# Patient Record
Sex: MALE | Race: OTHER | HISPANIC OR LATINO | Employment: OTHER | URBAN - METROPOLITAN AREA
[De-identification: names, ages, dates, MRNs, and addresses within clinical notes are randomized per-mention and may not be internally consistent; named-entity substitution may affect disease eponyms.]

---

## 2017-05-07 ENCOUNTER — HOSPITAL ENCOUNTER (EMERGENCY)
Facility: HOSPITAL | Age: 42
Discharge: HOME | End: 2017-05-08
Attending: EMERGENCY MEDICINE
Payer: COMMERCIAL

## 2017-05-07 DIAGNOSIS — R06.02 SHORTNESS OF BREATH: Primary | ICD-10-CM

## 2017-05-07 DIAGNOSIS — R04.2 BLOOD-TINGED SPUTUM: ICD-10-CM

## 2017-05-07 DIAGNOSIS — I10 ESSENTIAL HYPERTENSION: ICD-10-CM

## 2017-05-07 DIAGNOSIS — F14.90 COCAINE USE: ICD-10-CM

## 2017-05-07 DIAGNOSIS — J40 BRONCHITIS: ICD-10-CM

## 2017-05-07 LAB
ALBUMIN SERPL BCP-MCNC: 3.9 G/DL
ALP SERPL-CCNC: 66 U/L
ALT SERPL W/O P-5'-P-CCNC: 44 U/L
AMPHET+METHAMPHET UR QL: NEGATIVE
ANION GAP SERPL CALC-SCNC: 11 MMOL/L
APAP SERPL-MCNC: <3 UG/ML
APTT BLDCRRT: 25.6 SEC
AST SERPL-CCNC: 44 U/L
BACTERIA #/AREA URNS HPF: ABNORMAL /HPF
BARBITURATES UR QL SCN>200 NG/ML: NEGATIVE
BASOPHILS # BLD AUTO: 0.02 K/UL
BASOPHILS NFR BLD: 0.1 %
BENZODIAZ UR QL SCN>200 NG/ML: NEGATIVE
BILIRUB SERPL-MCNC: 0.7 MG/DL
BILIRUB UR QL STRIP: NEGATIVE
BNP SERPL-MCNC: 103 PG/ML
BUN SERPL-MCNC: 10 MG/DL
BZE UR QL SCN: NORMAL
CALCIUM SERPL-MCNC: 9.1 MG/DL
CANNABINOIDS UR QL SCN: NEGATIVE
CHLORIDE SERPL-SCNC: 101 MMOL/L
CLARITY UR: CLEAR
CO2 SERPL-SCNC: 26 MMOL/L
COLOR UR: YELLOW
CREAT SERPL-MCNC: 1.1 MG/DL
CREAT UR-MCNC: 312.4 MG/DL
DIFFERENTIAL METHOD: ABNORMAL
EOSINOPHIL # BLD AUTO: 0 K/UL
EOSINOPHIL NFR BLD: 0.2 %
ERYTHROCYTE [DISTWIDTH] IN BLOOD BY AUTOMATED COUNT: 13.8 %
EST. GFR  (AFRICAN AMERICAN): >60 ML/MIN/1.73 M^2
EST. GFR  (NON AFRICAN AMERICAN): >60 ML/MIN/1.73 M^2
ETHANOL SERPL-MCNC: <10 MG/DL
GLUCOSE SERPL-MCNC: 115 MG/DL
GLUCOSE UR QL STRIP: ABNORMAL
HCT VFR BLD AUTO: 52.7 %
HGB BLD-MCNC: 18 G/DL
HGB UR QL STRIP: ABNORMAL
HYALINE CASTS #/AREA URNS LPF: 0 /LPF
INR PPP: 1
KETONES UR QL STRIP: ABNORMAL
LEUKOCYTE ESTERASE UR QL STRIP: ABNORMAL
LYMPHOCYTES # BLD AUTO: 1.5 K/UL
LYMPHOCYTES NFR BLD: 10.7 %
MAGNESIUM SERPL-MCNC: 1.9 MG/DL
MCH RBC QN AUTO: 29.4 PG
MCHC RBC AUTO-ENTMCNC: 34.2 %
MCV RBC AUTO: 86 FL
METHADONE UR QL SCN>300 NG/ML: NEGATIVE
MICROSCOPIC COMMENT: ABNORMAL
MONOCYTES # BLD AUTO: 1 K/UL
MONOCYTES NFR BLD: 6.8 %
NEUTROPHILS # BLD AUTO: 11.8 K/UL
NEUTROPHILS NFR BLD: 82 %
NITRITE UR QL STRIP: NEGATIVE
OPIATES UR QL SCN: NEGATIVE
PCP UR QL SCN>25 NG/ML: NEGATIVE
PH UR STRIP: 7 [PH] (ref 5–8)
PLATELET # BLD AUTO: 159 K/UL
PMV BLD AUTO: 10.8 FL
POTASSIUM SERPL-SCNC: 3.6 MMOL/L
PROT SERPL-MCNC: 7.7 G/DL
PROT UR QL STRIP: ABNORMAL
PROTHROMBIN TIME: 10.7 SEC
RBC # BLD AUTO: 6.13 M/UL
RBC #/AREA URNS HPF: 5 /HPF (ref 0–4)
SALICYLATES SERPL-MCNC: <5 MG/DL
SODIUM SERPL-SCNC: 138 MMOL/L
SP GR UR STRIP: 1.02 (ref 1–1.03)
TOXICOLOGY INFORMATION: NORMAL
TROPONIN I SERPL DL<=0.01 NG/ML-MCNC: 0.02 NG/ML
TSH SERPL DL<=0.005 MIU/L-ACNC: 0.45 UIU/ML
URN SPEC COLLECT METH UR: ABNORMAL
UROBILINOGEN UR STRIP-ACNC: NEGATIVE EU/DL
WBC # BLD AUTO: 14.37 K/UL
WBC #/AREA URNS HPF: 0 /HPF (ref 0–5)

## 2017-05-07 PROCEDURE — 25000003 PHARM REV CODE 250: Performed by: EMERGENCY MEDICINE

## 2017-05-07 PROCEDURE — 80053 COMPREHEN METABOLIC PANEL: CPT

## 2017-05-07 PROCEDURE — 81000 URINALYSIS NONAUTO W/SCOPE: CPT

## 2017-05-07 PROCEDURE — 84443 ASSAY THYROID STIM HORMONE: CPT

## 2017-05-07 PROCEDURE — 84484 ASSAY OF TROPONIN QUANT: CPT

## 2017-05-07 PROCEDURE — 83735 ASSAY OF MAGNESIUM: CPT

## 2017-05-07 PROCEDURE — 82570 ASSAY OF URINE CREATININE: CPT

## 2017-05-07 PROCEDURE — 25500020 PHARM REV CODE 255: Performed by: EMERGENCY MEDICINE

## 2017-05-07 PROCEDURE — 80307 DRUG TEST PRSMV CHEM ANLYZR: CPT

## 2017-05-07 PROCEDURE — 85025 COMPLETE CBC W/AUTO DIFF WBC: CPT

## 2017-05-07 PROCEDURE — 85610 PROTHROMBIN TIME: CPT

## 2017-05-07 PROCEDURE — 85730 THROMBOPLASTIN TIME PARTIAL: CPT

## 2017-05-07 PROCEDURE — 83880 ASSAY OF NATRIURETIC PEPTIDE: CPT

## 2017-05-07 PROCEDURE — 93005 ELECTROCARDIOGRAM TRACING: CPT

## 2017-05-07 PROCEDURE — 96361 HYDRATE IV INFUSION ADD-ON: CPT

## 2017-05-07 PROCEDURE — 63600175 PHARM REV CODE 636 W HCPCS: Performed by: EMERGENCY MEDICINE

## 2017-05-07 PROCEDURE — 80320 DRUG SCREEN QUANTALCOHOLS: CPT

## 2017-05-07 PROCEDURE — 80307 DRUG TEST PRSMV CHEM ANLYZR: CPT | Mod: 59

## 2017-05-07 PROCEDURE — 80329 ANALGESICS NON-OPIOID 1 OR 2: CPT

## 2017-05-07 PROCEDURE — 96374 THER/PROPH/DIAG INJ IV PUSH: CPT

## 2017-05-07 PROCEDURE — 99284 EMERGENCY DEPT VISIT MOD MDM: CPT | Mod: 25

## 2017-05-07 RX ORDER — DIAZEPAM 10 MG/2ML
5 INJECTION INTRAMUSCULAR
Status: COMPLETED | OUTPATIENT
Start: 2017-05-07 | End: 2017-05-07

## 2017-05-07 RX ADMIN — DIAZEPAM 5 MG: 5 INJECTION, SOLUTION INTRAMUSCULAR; INTRAVENOUS at 09:05

## 2017-05-07 RX ADMIN — IOHEXOL 70 ML: 350 INJECTION, SOLUTION INTRAVENOUS at 08:05

## 2017-05-07 RX ADMIN — SODIUM CHLORIDE 1000 ML: 0.9 INJECTION, SOLUTION INTRAVENOUS at 09:05

## 2017-05-07 RX ADMIN — SODIUM CHLORIDE 1000 ML: 0.9 INJECTION, SOLUTION INTRAVENOUS at 07:05

## 2017-05-07 NOTE — ED AVS SNAPSHOT
[facility]  [address] [address]               Bernabe Hale   2017  7:30 PM   ED    Description:  Male : 1975   Department:  [facility]           Your Care was Coordinated By:     Provider Role From To    Nela Page MD Attending Provider [phone] --      Reason for Visit     Shortness of Breath           Diagnoses this Visit        Comments    Shortness of breath    -  Primary     Bronchitis         Blood-tinged sputum         Essential hypertension         Cocaine use           ED Disposition     ED Disposition Condition Comment    Discharge             To Do List           Follow-up Information     Please follow up.    Why:  Follow-up with your primary care doctor this week.       These Medications        Disp Refills Start End    hydrochlorothiazide (HYDRODIURIL) 25 MG tablet 30 tablet 0 2017    Take 1 tablet (25 mg total) by mouth once daily. - Oral    doxycycline (VIBRAMYCIN) 100 MG Cap 14 capsule 0 2017 5/15/2017    Take 1 capsule (100 mg total) by mouth 2 (two) times daily. - Oral      [facility]     [facility] Care Line -  Assistance  Unless otherwise directed by your provider, please contact [facility], our nurse care line that is available for  assistance.     Registered nurses in the [facility] provide: appointment scheduling, clinical advisement, health education, and other advisory services.  Call: [phone] (toll free)               Medications           Message regarding Medications     Verify the changes and/or additions to your medication regime listed below are the same as discussed with your clinician today.  If any of these changes or additions are incorrect, please notify your healthcare provider.        START taking these NEW medications        Refills    hydrochlorothiazide (HYDRODIURIL) 25 MG tablet 0    Sig: Take 1  "tablet (25 mg total) by mouth once daily.    Class: Print    Route: Oral    doxycycline (VIBRAMYCIN) 100 MG Cap 0    Sig: Take 1 capsule (100 mg total) by mouth 2 (two) times daily.    Class: Print    Route: Oral      These medications were administered today        Dose Freq    sodium chloride 0.9% bolus 1,000 mL 1,000 mL Once    Sig: Inject 1,000 mLs into the vein once.    Class: Normal    Route: Intravenous    omnipaque 350 iohexol 70 mL 70 mL IMG once as needed    Sig: Inject 70 mLs into the vein ONCE PRN for contrast.    Class: Normal    Route: Intravenous    sodium chloride 0.9% bolus 1,000 mL 1,000 mL ED 1 Time    Sig: Inject 1,000 mLs into the vein ED 1 Time.    Class: Normal    Route: Intravenous    diazePAM injection 5 mg 5 mg ED 1 Time    Sig: Inject 1 mL (5 mg total) into the vein ED 1 Time.    Class: Normal    Route: Intravenous    hydrochlorothiazide tablet 25 mg 25 mg ED 1 Time    Sig: Take 1 tablet (25 mg total) by mouth ED 1 Time.    Class: Normal    Route: Oral           Verify that the below list of medications is an accurate representation of the medications you are currently taking.  If none reported, the list may be blank. If incorrect, please contact your healthcare provider. Carry this list with you in case of emergency.           Current Medications     doxycycline (VIBRAMYCIN) 100 MG Cap Take 1 capsule (100 mg total) by mouth 2 (two) times daily.    hydrochlorothiazide (HYDRODIURIL) 25 MG tablet Take 1 tablet (25 mg total) by mouth once daily.    hydrochlorothiazide tablet 25 mg Take 1 tablet (25 mg total) by mouth ED 1 Time.    omeprazole (PRILOSEC) 20 MG capsule Take 20 mg by mouth once daily.           Clinical Reference Information           Your Vitals Were     BP Pulse Temp Resp Height Weight    187/112 96 98.8 °F (37.1 °C) (Oral) 22 5' 11" (1.803 m) 113.4 kg (250 lb)    SpO2 BMI             98% 34.87 kg/m2         Allergies as of 5/8/2017        Reactions    Avelox [Moxifloxacin]     "   Immunizations Administered on Date of Encounter - 5/8/2017     None      ED Micro, Lab, POCT     Start Ordered       Status Ordering Provider    05/07/17 1940 05/07/17 1939  TSH  Once      Final result     05/07/17 1940 05/07/17 1939  APTT  STAT      Final result     05/07/17 1940 05/07/17 1939  Protime-INR  STAT      Final result     05/07/17 1940 05/07/17 1939  Troponin I  STAT      Final result     05/07/17 1940 05/07/17 1939  B-Type natriuretic peptide (BNP)  STAT      Final result     05/07/17 1940 05/07/17 1939  Magnesium  Once      Final result     05/07/17 1939 05/07/17 1939  CBC auto differential  STAT      Final result     05/07/17 1939 05/07/17 1939  Comprehensive metabolic panel  STAT      Final result     05/07/17 1939 05/07/17 1939  Urinalysis - Clean Catch  STAT      Final result     05/07/17 1939 05/07/17 1939  Ethanol  Once      Final result     05/07/17 1939 05/07/17 1939  Acetaminophen level  Once      Final result     05/07/17 1939 05/07/17 1939  Salicylate level  Once      Final result     05/07/17 1939 05/07/17 1939  Drug screen panel, emergency  STAT      Final result     05/07/17 1939 05/07/17 1939  Urinalysis Microscopic  Once      Final result       ED Imaging Orders     Start Ordered       Status Ordering Provider    05/07/17 1940 05/07/17 1939  CTA Chest Non-Coronary (PE Study)  1 time imaging      Final result     05/07/17 1939 05/07/17 1939  X-Ray Chest AP Portable  1 time imaging      Final result         Discharge Instructions       Do not use cocaine.  Begin taking hydrochlorothiazide as prescribed.  Follow-up with primary care doctor this week for repeat blood pressure check and adjustment of blood pressure medicine.  Take antibiotics until all gone.  Return to emergency department for worsening symptoms, chest pain, persistent shortness of breath, fever, or any other concerns.    Discharge References/Attachments     HEMOPTYSIS (ENGLISH)    HYPERTENSION, NEW (BEGIN TREATMENT)  (ENGLISH)      [facility] Sign-Up     Activating your [facility] account is as easy as 1-2-3!     1) Visit [email or web address], select Sign Up Now, enter this activation code and your date of birth, then select Next.  [email or web address]  Expires: 6/22/2017 12:44 AM      2) Create a username and password to use when you visit [facility] in the future and select a security question in case you lose your password and select Next.    3) Enter your e-mail address and click Sign Up!    Additional Information  If you have questions, please e-mail [email or web address] or call [phone] to talk to our [facility] staff. Remember, [facility] is NOT to be used for urgent needs. For medical emergencies, dial 911.          [facility] complies with applicable Federal civil rights laws and does not discriminate on the basis of race, color, national origin, age, disability, or sex.        Language Assistance Services     ATTENTION: Language assistance services are available, free of charge. Please call [phone].      ATENCIÓN: Si habla español, tiene a noriega disposición servicios gratuitos de asistencia lingüística. Llame al [phone].     CHÚ Ý: N?u b?n nói Ti?ng Vi?t, có các d?ch v? h? tr? ngôn ng? mi?n phí dành cho b?n. G?i s? [phone].

## 2017-05-08 VITALS
RESPIRATION RATE: 22 BRPM | DIASTOLIC BLOOD PRESSURE: 112 MMHG | BODY MASS INDEX: 35 KG/M2 | WEIGHT: 250 LBS | TEMPERATURE: 99 F | OXYGEN SATURATION: 98 % | HEIGHT: 71 IN | HEART RATE: 96 BPM | SYSTOLIC BLOOD PRESSURE: 187 MMHG

## 2017-05-08 PROCEDURE — 25000003 PHARM REV CODE 250: Performed by: EMERGENCY MEDICINE

## 2017-05-08 RX ORDER — DOXYCYCLINE 100 MG/1
100 CAPSULE ORAL 2 TIMES DAILY
Qty: 14 CAPSULE | Refills: 0 | Status: SHIPPED | OUTPATIENT
Start: 2017-05-08 | End: 2017-05-15

## 2017-05-08 RX ORDER — HYDROCHLOROTHIAZIDE 25 MG/1
25 TABLET ORAL
Status: COMPLETED | OUTPATIENT
Start: 2017-05-08 | End: 2017-05-08

## 2017-05-08 RX ORDER — HYDROCHLOROTHIAZIDE 25 MG/1
25 TABLET ORAL DAILY
Qty: 30 TABLET | Refills: 0 | Status: SHIPPED | OUTPATIENT
Start: 2017-05-08 | End: 2023-02-17

## 2017-05-08 RX ADMIN — HYDROCHLOROTHIAZIDE 25 MG: 25 TABLET ORAL at 12:05

## 2017-05-08 NOTE — ED TRIAGE NOTES
Pt reports SOB and coughing up blood since last night. Denies and nv, weakness, chest pain. Reports some dizziness. Pt is sating 90% on RA. Pt put on 5L NC. Will continue to monitor.

## 2017-05-08 NOTE — DISCHARGE INSTRUCTIONS
Do not use cocaine.  Begin taking hydrochlorothiazide as prescribed.  Follow-up with primary care doctor this week for repeat blood pressure check and adjustment of blood pressure medicine.  Take antibiotics until all gone.  Return to emergency department for worsening symptoms, chest pain, persistent shortness of breath, fever, or any other concerns.

## 2017-05-08 NOTE — ED PROVIDER NOTES
Encounter Date: 5/7/2017    SCRIBE #1 NOTE: I, Theresa Barlow, am scribing for, and in the presence of,  Nela Page MD. I have scribed the following portions of the note - Other sections scribed: HPI/ROS.       History     Chief Complaint   Patient presents with    Shortness of Breath     since last night, reports blood tinged sputum when coughing      Review of patient's allergies indicates:   Allergen Reactions    Avelox [moxifloxacin]      HPI Comments: CC: Shortness of Breath    HPI: 41 y.o. M with no pertinent PMHx presents to the ED c/o SOB with associated productive cough beginning last night. Pt reports he is coughing up blood tinged sputum today. Symptoms are moderate. No prior tx reported. Pt denies prior similar episodes, hx of blood clots, cigarette use, fever, N/V, CP, and leg swelling.     The history is provided by the patient.     Past Medical History:   Diagnosis Date    GERD (gastroesophageal reflux disease)      No past surgical history on file.  Family History   Problem Relation Age of Onset    Family history unknown: Yes     Social History   Substance Use Topics    Smoking status: Never Smoker    Smokeless tobacco: None    Alcohol use Yes     Review of Systems   Constitutional: Negative for fever.   HENT: Negative for ear pain and sore throat.    Eyes: Negative for pain.   Respiratory: Positive for cough and shortness of breath.    Cardiovascular: Negative for chest pain.   Gastrointestinal: Negative for abdominal pain, nausea and vomiting.   Genitourinary: Negative for dysuria.   Musculoskeletal: Negative for back pain.   Skin: Negative for rash.   Neurological: Negative for headaches.       Physical Exam   Initial Vitals   BP Pulse Resp Temp SpO2   05/07/17 1926 05/07/17 1926 05/07/17 1926 05/07/17 1926 05/07/17 1926   202/121 125 22 98.8 °F (37.1 °C) 94 %     Physical Exam    Nursing note and vitals reviewed.  Constitutional: He appears well-developed and well-nourished. He is  active.  Non-toxic appearance.   Appears uncomfortable, anxious.   HENT:   Head: Normocephalic and atraumatic.   Eyes: EOM are normal.   Neck: Neck supple.   Cardiovascular: Regular rhythm and normal heart sounds. Tachycardia present.  Exam reveals no gallop and no friction rub.    No murmur heard.  Pulmonary/Chest: Breath sounds normal. No respiratory distress. He has no decreased breath sounds. He has no wheezes. He has no rhonchi. He has no rales.   Abdominal: Soft. He exhibits no distension and no pulsatile midline mass.   Musculoskeletal: Normal range of motion. He exhibits no edema.   No swelling to bilateral lower extremities.    Neurological: He is alert and oriented to person, place, and time.   Skin: Skin is warm. No rash noted.   Psychiatric: He has a normal mood and affect.         ED Course   Procedures  Labs Reviewed   CBC W/ AUTO DIFFERENTIAL - Abnormal; Notable for the following:        Result Value    WBC 14.37 (*)     Gran # 11.8 (*)     Gran% 82.0 (*)     Lymph% 10.7 (*)     All other components within normal limits   COMPREHENSIVE METABOLIC PANEL - Abnormal; Notable for the following:     Glucose 115 (*)     AST 44 (*)     All other components within normal limits   URINALYSIS - Abnormal; Notable for the following:     Protein, UA 1+ (*)     Glucose, UA 1+ (*)     Ketones, UA Trace (*)     Occult Blood UA 1+ (*)     Leukocytes, UA Trace (*)     All other components within normal limits   ACETAMINOPHEN LEVEL - Abnormal; Notable for the following:     Acetaminophen (Tylenol), Serum <3.0 (*)     All other components within normal limits   SALICYLATE LEVEL - Abnormal; Notable for the following:     Salicylate Lvl <5.0 (*)     All other components within normal limits   B-TYPE NATRIURETIC PEPTIDE - Abnormal; Notable for the following:      (*)     All other components within normal limits   URINALYSIS MICROSCOPIC - Abnormal; Notable for the following:     RBC, UA 5 (*)     All other components  "within normal limits   ALCOHOL,MEDICAL (ETHANOL)   DRUG SCREEN PANEL, URINE EMERGENCY   TSH   APTT   PROTIME-INR   TROPONIN I   MAGNESIUM     EKG Readings: (Independently Interpreted)   Sinus tachycardia, rate approximately 114.  No ST elevation. T-wave inversion present in inferior and lateral leads.  QTc 487.  No old EKG to compare.          Medical Decision Making:   History:   Old Medical Records: I decided to obtain old medical records.  Differential Diagnosis:   PE, CHF, MI, pneumonia, sepsis, among others.  Independently Interpreted Test(s):   I have ordered and independently interpreted X-rays - see summary below.       <> Summary of X-Ray Reading(s): Chest x-ray independently interpreted by me as negative for acute infiltrates, pneumothorax, effusion, widening mediastinum, or other acute cardiopulmonary process.  I have ordered and independently interpreted EKG Reading(s) - see summary below       <> Summary of EKG Reading(s): EKG independently interpreted by me, is negative for acute ischemia.  41 y.o. male with GERD, presents emergency department complaining of shortness of breath since last night, hemoptysis today.  He states he was coughing up blood-tinged sputum.  Upon arrival to emergency department, the patient is severely hypertensive, tachycardic.  Given IV fluids.  CTA chest ordered.  This is negative for acute PE.  Labs reveal mild leukocytosis, white blood cell count 14.37.  Hemoglobin 18, hematocrit 52.7.  Creatinine 1.1.  BUN 10.  Glucose 1:15.  Alcohol, Tylenol, salicylate levels are not elevated.  Troponin 0.019, .  Urine drug screen is positive for cocaine.  After further discussion with the patient, he reports being at a party last night, and admits to snorting "a little bit" of cocaine.  Patient observed in the emergency department for several hours.  His blood pressure transiently improved, however remained elevated.  Oral hydrochlorothiazide ordered.  Tachycardia improved with IV " "hydration and IV valium.  Symptoms may be directly related to cocaine, however patient states that this is unlikely since he only snorted "a little bit".  I will hold off on treating his blood pressure with beta blocker.  Patient has significantly improved.  I believe he may be discharged home with outpatient follow-up.  I will start him on hydrochlorothiazide for his elevated blood pressure and also cover for bronchitis with doxycycline.  Patient given should return warnings.  He states understanding discharge plan and is comfortable going home.            Scribe Attestation:   Scribe #1: I performed the above scribed service and the documentation accurately describes the services I performed. I attest to the accuracy of the note.    Attending Attestation:           Physician Attestation for Scribe:  Physician Attestation Statement for Scribe #1: I, Nela Page MD, reviewed documentation, as scribed by Theresa Barlow in my presence, and it is both accurate and complete.                 ED Course     Clinical Impression:   The primary encounter diagnosis was Shortness of breath. Diagnoses of Bronchitis, Blood-tinged sputum, Essential hypertension, and Cocaine use were also pertinent to this visit.          Nela Page MD  05/10/17 1403    "

## 2018-01-29 ENCOUNTER — HOSPITAL ENCOUNTER (EMERGENCY)
Facility: HOSPITAL | Age: 43
Discharge: HOME | End: 2018-01-29
Attending: EMERGENCY MEDICINE
Payer: COMMERCIAL

## 2018-01-29 VITALS
RESPIRATION RATE: 20 BRPM | HEART RATE: 96 BPM | TEMPERATURE: 99 F | DIASTOLIC BLOOD PRESSURE: 81 MMHG | BODY MASS INDEX: 33.6 KG/M2 | WEIGHT: 240 LBS | HEIGHT: 71 IN | OXYGEN SATURATION: 100 % | SYSTOLIC BLOOD PRESSURE: 124 MMHG

## 2018-01-29 DIAGNOSIS — R00.0 TACHYCARDIA: ICD-10-CM

## 2018-01-29 DIAGNOSIS — R68.89 FLU-LIKE SYMPTOMS: Primary | ICD-10-CM

## 2018-01-29 LAB
ALBUMIN SERPL BCP-MCNC: 4.3 G/DL
ALP SERPL-CCNC: 63 U/L
ALT SERPL W/O P-5'-P-CCNC: 43 U/L
ANION GAP SERPL CALC-SCNC: 10 MMOL/L
AST SERPL-CCNC: 32 U/L
BASOPHILS # BLD AUTO: 0.01 K/UL
BASOPHILS NFR BLD: 0.2 %
BILIRUB SERPL-MCNC: 0.6 MG/DL
BUN SERPL-MCNC: 14 MG/DL
CALCIUM SERPL-MCNC: 9.9 MG/DL
CHLORIDE SERPL-SCNC: 102 MMOL/L
CO2 SERPL-SCNC: 23 MMOL/L
CREAT SERPL-MCNC: 1.6 MG/DL
DIFFERENTIAL METHOD: ABNORMAL
EOSINOPHIL # BLD AUTO: 0 K/UL
EOSINOPHIL NFR BLD: 0 %
ERYTHROCYTE [DISTWIDTH] IN BLOOD BY AUTOMATED COUNT: 13.5 %
EST. GFR  (AFRICAN AMERICAN): >60 ML/MIN/1.73 M^2
EST. GFR  (NON AFRICAN AMERICAN): 52 ML/MIN/1.73 M^2
GLUCOSE SERPL-MCNC: 97 MG/DL
HCT VFR BLD AUTO: 48.8 %
HGB BLD-MCNC: 17.3 G/DL
INR PPP: 1.1
LYMPHOCYTES # BLD AUTO: 0.9 K/UL
LYMPHOCYTES NFR BLD: 14.5 %
MCH RBC QN AUTO: 29.6 PG
MCHC RBC AUTO-ENTMCNC: 35.5 G/DL
MCV RBC AUTO: 84 FL
MONOCYTES # BLD AUTO: 1 K/UL
MONOCYTES NFR BLD: 15.8 %
NEUTROPHILS # BLD AUTO: 4.4 K/UL
NEUTROPHILS NFR BLD: 69.3 %
PLATELET # BLD AUTO: 113 K/UL
PMV BLD AUTO: 10.7 FL
POTASSIUM SERPL-SCNC: 3.4 MMOL/L
PROT SERPL-MCNC: 8.4 G/DL
PROTHROMBIN TIME: 11.7 SEC
RBC # BLD AUTO: 5.84 M/UL
SODIUM SERPL-SCNC: 135 MMOL/L
WBC # BLD AUTO: 6.4 K/UL

## 2018-01-29 PROCEDURE — 80053 COMPREHEN METABOLIC PANEL: CPT

## 2018-01-29 PROCEDURE — 93005 ELECTROCARDIOGRAM TRACING: CPT

## 2018-01-29 PROCEDURE — 93010 ELECTROCARDIOGRAM REPORT: CPT | Mod: ,,, | Performed by: INTERNAL MEDICINE

## 2018-01-29 PROCEDURE — 25000003 PHARM REV CODE 250: Performed by: NURSE PRACTITIONER

## 2018-01-29 PROCEDURE — 85610 PROTHROMBIN TIME: CPT

## 2018-01-29 PROCEDURE — 85025 COMPLETE CBC W/AUTO DIFF WBC: CPT

## 2018-01-29 PROCEDURE — 96360 HYDRATION IV INFUSION INIT: CPT

## 2018-01-29 PROCEDURE — 99284 EMERGENCY DEPT VISIT MOD MDM: CPT | Mod: 25

## 2018-01-29 PROCEDURE — 87040 BLOOD CULTURE FOR BACTERIA: CPT | Mod: 59

## 2018-01-29 RX ORDER — OSELTAMIVIR PHOSPHATE 75 MG/1
75 CAPSULE ORAL 2 TIMES DAILY
Qty: 10 CAPSULE | Refills: 0 | Status: SHIPPED | OUTPATIENT
Start: 2018-01-29 | End: 2018-02-03

## 2018-01-29 RX ORDER — ONDANSETRON 2 MG/ML
8 INJECTION INTRAMUSCULAR; INTRAVENOUS
Status: DISCONTINUED | OUTPATIENT
Start: 2018-01-29 | End: 2018-01-29 | Stop reason: HOSPADM

## 2018-01-29 RX ORDER — PROMETHAZINE HYDROCHLORIDE AND DEXTROMETHORPHAN HYDROBROMIDE 6.25; 15 MG/5ML; MG/5ML
5 SYRUP ORAL EVERY 6 HOURS PRN
Qty: 118 ML | Refills: 0 | Status: SHIPPED | OUTPATIENT
Start: 2018-01-29 | End: 2018-02-08

## 2018-01-29 RX ORDER — OSELTAMIVIR PHOSPHATE 75 MG/1
75 CAPSULE ORAL
Status: COMPLETED | OUTPATIENT
Start: 2018-01-29 | End: 2018-01-29

## 2018-01-29 RX ORDER — PROMETHAZINE HYDROCHLORIDE AND CODEINE PHOSPHATE 6.25; 1 MG/5ML; MG/5ML
5 SOLUTION ORAL
Status: COMPLETED | OUTPATIENT
Start: 2018-01-29 | End: 2018-01-29

## 2018-01-29 RX ORDER — ONDANSETRON 4 MG/1
4 TABLET, FILM COATED ORAL EVERY 6 HOURS PRN
Qty: 12 TABLET | Refills: 0 | Status: SHIPPED | OUTPATIENT
Start: 2018-01-29 | End: 2019-11-20

## 2018-01-29 RX ORDER — ACETAMINOPHEN 500 MG
1000 TABLET ORAL
Status: COMPLETED | OUTPATIENT
Start: 2018-01-29 | End: 2018-01-29

## 2018-01-29 RX ADMIN — ACETAMINOPHEN 1000 MG: 500 TABLET ORAL at 02:01

## 2018-01-29 RX ADMIN — SODIUM CHLORIDE 2000 ML: 0.9 INJECTION, SOLUTION INTRAVENOUS at 01:01

## 2018-01-29 RX ADMIN — OSELTAMIVIR PHOSPHATE 75 MG: 75 CAPSULE ORAL at 02:01

## 2018-01-29 RX ADMIN — PROMETHAZINE HYDROCHLORIDE AND CODEINE PHOSPHATE 5 ML: 10; 6.25 SOLUTION ORAL at 03:01

## 2018-01-29 NOTE — DISCHARGE INSTRUCTIONS
Alternate ibuprofen and tylenol for fever and pain. Drink plenty of fluids. Rest.     Please return to the Emergency Department for any new or worsening symptoms including: fever, chest pain, shortness of breath, loss of consciousness, dizziness, weakness, or any other concerns.     Please follow up with your Primary Care Provider within in the week. If you do not have one, you may contact the one listed on your discharge paperwork or you may also call the [facility]k at [phone] to schedule an appointment with one.     Please take all medication as prescribed.

## 2018-01-29 NOTE — ED TRIAGE NOTES
"Pt arrived to ER with complaints of " I have been having fever and cough (flu-like symptoms) since Saturday". Pt rates the pain as 4/10 at this time. Pt reports taking Theraflu and Tylenol since Saturday. Pt reports nausea, denies vomiting, denies shortness of breath or urinary symptoms.   Past Medical History:   Diagnosis Date    GERD (gastroesophageal reflux disease)      No past surgical history on file.     "

## 2018-01-29 NOTE — ED PROVIDER NOTES
Encounter Date: 1/29/2018       History     Chief Complaint   Patient presents with    Fever     Reports having fever and chills x 2 days. Reports taking theraflu and tylenol severe that broke his fever.    Chills     CC: Fever, chills    HPI: Patient is a 42-year-old male who presents today with fever and chills since Saturday. He also reports associated congestion, rhinorrhea, bilateral ear pain, nonproductive cough, nausea, and diarrhea.  Reports 2 soft bowel movements today.  No episodes of emesis.  He took TheraFlu and Tylenol at 3 AM. He denies sore throat, shortness of breath, chest pain, [place] pain, urinary changes.      The history is provided by the patient. No  was used.     Review of patient's allergies indicates:   Allergen Reactions    Avelox [moxifloxacin] Anaphylaxis     Past Medical History:   Diagnosis Date    GERD (gastroesophageal reflux disease)      History reviewed. No pertinent surgical history.  Family History   Problem Relation Age of Onset    Family history unknown: Yes     Social History   Substance Use Topics    Smoking status: Never Smoker    Smokeless tobacco: Not on file    Alcohol use Yes      Comment: occasional     Review of Systems   Constitutional: Positive for chills and fever. Negative for activity change and appetite change.   HENT: Positive for congestion and rhinorrhea. Negative for ear discharge, ear pain, sinus pain, sinus pressure and sore throat.    Eyes: Negative for photophobia, pain, discharge and itching.   Respiratory: Positive for cough. Negative for apnea, choking, chest tightness, shortness of breath, wheezing and stridor.    Cardiovascular: Negative for chest pain, palpitations and leg swelling.   Gastrointestinal: Positive for diarrhea and nausea. Negative for abdominal pain and constipation.   Genitourinary: Negative for decreased urine volume, difficulty urinating, dysuria, frequency and urgency.   Musculoskeletal: Negative for  back pain, neck pain and neck stiffness.   Skin: Negative for rash.   Neurological: Positive for headaches. Negative for dizziness, weakness and light-headedness.   Hematological: Does not bruise/bleed easily.       Physical Exam     Initial Vitals [01/29/18 1012]   BP Pulse Resp Temp SpO2   (!) 127/98 (!) 125 20 99.5 °F (37.5 °C) 97 %      MAP       107.67         Physical Exam    Vitals reviewed.  Constitutional: He appears well-developed and well-nourished. He is not diaphoretic. He does not appear ill. No distress. He is not intubated.   HENT:   Head: Normocephalic and atraumatic.   Right Ear: Hearing, tympanic membrane, external ear and ear canal normal. Tympanic membrane is not erythematous and not bulging. No middle ear effusion.   Left Ear: Hearing, tympanic membrane, external ear and ear canal normal. Tympanic membrane is not erythematous and not bulging.  No middle ear effusion.   Nose: Mucosal edema and rhinorrhea present. Right sinus exhibits no maxillary sinus tenderness and no frontal sinus tenderness. Left sinus exhibits no maxillary sinus tenderness and no frontal sinus tenderness.   Mouth/Throat: Uvula is midline, oropharynx is clear and moist and mucous membranes are normal. No trismus in the jaw. No uvula swelling. No oropharyngeal exudate, posterior oropharyngeal edema, posterior oropharyngeal erythema or tonsillar abscesses.   Eyes: Conjunctivae and EOM are normal. Pupils are equal, round, and reactive to light. Right eye exhibits no discharge. Left eye exhibits no discharge.   Neck: Normal range of motion and full passive range of motion without pain. Neck supple. No spinous process tenderness and no muscular tenderness present. No edema, no erythema and normal range of motion present. No neck rigidity.   Cardiovascular: Normal rate, regular rhythm and normal heart sounds. Exam reveals no gallop and no friction rub.    No murmur heard.  Pulmonary/Chest: Breath sounds normal. No accessory muscle  usage. No apnea, no tachypnea and no bradypnea. He is not intubated. No respiratory distress. He has no wheezes. He has no rhonchi. He has no rales. He exhibits no tenderness.   Abdominal: Soft. Bowel sounds are normal. He exhibits no distension and no mass. There is no hepatosplenomegaly. There is no tenderness. There is no rigidity, no rebound, no guarding, no CVA tenderness, no tenderness at McBurney's point and negative Araujo's sign.   Musculoskeletal: Normal range of motion.   Neurological: He is alert and oriented to person, place, and time. GCS eye subscore is 4. GCS verbal subscore is 5. GCS motor subscore is 6.   Skin: Skin is warm, dry and intact. No rash noted. No erythema.   Psychiatric: He has a normal mood and affect. Thought content normal.         ED Course   Procedures  Labs Reviewed   CBC W/ AUTO DIFFERENTIAL - Abnormal; Notable for the following:        Result Value    Platelets 113 (*)     Lymph # 0.9 (*)     Lymph% 14.5 (*)     Mono% 15.8 (*)     All other components within normal limits    Narrative:     Recoll. [id number] by TW at 01/29/2018 12:52, reason: Specimen   clotted  Tube has been discarded   COMPREHENSIVE METABOLIC PANEL - Abnormal; Notable for the following:     Sodium 135 (*)     Potassium 3.4 (*)     Creatinine 1.6 (*)     eGFR if non  52 (*)     All other components within normal limits   CULTURE, BLOOD   CULTURE, BLOOD   PROTIME-INR    Narrative:     Recoll. [id number] by TWW at 01/29/2018 12:52, reason: Specimen   clotted  Tube has been discarded     EKG Readings: (Independently Interpreted)   Initial Reading: No STEMI. Rhythm: Sinus Tachycardia. Heart Rate: 125. Ectopy: No Ectopy. Conduction: Normal.   Reviewed by Dr. Cardona.          Medical Decision Making:   ED Management:  This is an evaluation of a 42 y.o. male that presents to the Emergency Department for fever, chills, cough, rhinorrhea and nasal congestion for 3 days. The patient is a non-toxic,  afebrile, and well appearing male. On physical exam ears and pharynx are without evidence of infection. Appears well hydrated with moist mucus membranes. Neck soft and supple with no meningeal signs or cervical lymphadenopathy. Breath sounds are clear and equal bilaterally with no adventitious breath sounds, tachypnea or respiratory distress with room air pulse ox of 100% and no evidence of hypoxia.     Vital Signs Are Reassuring.  RESULTS:   CBC was negativefor leukocytosis  indicating unlikely systemic infection, the H&H was stable and was within normal limits and indicating absence of anemia. The platelet count was normal indicating the absence of a tendency toward bleeding.  K 3.4; Cr 1.6; eGFR 52.   PT 11.7, INR 1.1.  Blood Culture ×2 pending.     My overall impression flulike symptoms. I considered, but at this time, do not suspect OM, OE, strep pharyngitis, meningitis, pneumonia, or acute bacterial sinusitis.    He was given 2 L normal saline, Tylenol, and Tamiflu with good relief in symptoms.  Initially tachycardic with heart rate 125.  After fluids and medication heart rate improved to 96.  Afebrile. Tolerating oral intake with no episodes of nausea or vomiting.  Patient has full symptoms, I'll treat him with Tamiflu.    ED Course: IV, IV fluids, promethazine with codeine, Tylenol, Tamiflu. D/C Meds: Zofran, promethazine DM, Tamiflu. Additional D/C Information: take Motrin and Tylenol for fever. The diagnosis, treatment plan, instructions for follow-up and reevaluation with PCP as well as ED return precautions were discussed and understanding was verbalized. All questions or concerns have been addressed.     This case was discussed with Dr. Cardona who is in agreement with my assessment and plan.                    ED Course      Clinical Impression:   The primary encounter diagnosis was Flu-like symptoms. A diagnosis of Tachycardia was also pertinent to this visit.    Disposition:   Disposition:  Discharged  Condition: Stable                        Annalisa Bergman, MARLENY  01/29/18 1936

## 2018-02-03 LAB
BACTERIA BLD CULT: NORMAL
BACTERIA BLD CULT: NORMAL

## 2019-11-20 ENCOUNTER — HOSPITAL ENCOUNTER (EMERGENCY)
Facility: HOSPITAL | Age: 44
Discharge: HOME | End: 2019-11-20
Attending: EMERGENCY MEDICINE
Payer: COMMERCIAL

## 2019-11-20 VITALS
DIASTOLIC BLOOD PRESSURE: 98 MMHG | HEART RATE: 81 BPM | HEIGHT: 71 IN | RESPIRATION RATE: 18 BRPM | SYSTOLIC BLOOD PRESSURE: 139 MMHG | WEIGHT: 245 LBS | BODY MASS INDEX: 34.3 KG/M2 | TEMPERATURE: 98 F | OXYGEN SATURATION: 98 %

## 2019-11-20 DIAGNOSIS — J95.830 HEMORRHAGE FOLLOWING TONSILLECTOMY: Primary | ICD-10-CM

## 2019-11-20 LAB
ABO + RH BLD: NORMAL
ANION GAP SERPL CALC-SCNC: 7 MMOL/L (ref 8–16)
BASOPHILS # BLD AUTO: 0.01 K/UL (ref 0–0.2)
BASOPHILS NFR BLD: 0.1 % (ref 0–1.9)
BLD GP AB SCN CELLS X3 SERPL QL: NORMAL
BUN SERPL-MCNC: 33 MG/DL (ref 6–20)
CALCIUM SERPL-MCNC: 8.9 MG/DL (ref 8.7–10.5)
CHLORIDE SERPL-SCNC: 107 MMOL/L (ref 95–110)
CO2 SERPL-SCNC: 24 MMOL/L (ref 23–29)
CREAT SERPL-MCNC: 1 MG/DL (ref 0.5–1.4)
DIFFERENTIAL METHOD: NORMAL
EOSINOPHIL # BLD AUTO: 0.1 K/UL (ref 0–0.5)
EOSINOPHIL NFR BLD: 1.1 % (ref 0–8)
ERYTHROCYTE [DISTWIDTH] IN BLOOD BY AUTOMATED COUNT: 12.9 % (ref 11.5–14.5)
EST. GFR  (AFRICAN AMERICAN): >60 ML/MIN/1.73 M^2
EST. GFR  (NON AFRICAN AMERICAN): >60 ML/MIN/1.73 M^2
GLUCOSE SERPL-MCNC: 96 MG/DL (ref 70–110)
HCT VFR BLD AUTO: 47.8 % (ref 40–54)
HGB BLD-MCNC: 15.7 G/DL (ref 14–18)
IMM GRANULOCYTES # BLD AUTO: 0.02 K/UL (ref 0–0.04)
IMM GRANULOCYTES NFR BLD AUTO: 0.2 % (ref 0–0.5)
LYMPHOCYTES # BLD AUTO: 2 K/UL (ref 1–4.8)
LYMPHOCYTES NFR BLD: 23.7 % (ref 18–48)
MCH RBC QN AUTO: 28.2 PG (ref 27–31)
MCHC RBC AUTO-ENTMCNC: 32.8 G/DL (ref 32–36)
MCV RBC AUTO: 86 FL (ref 82–98)
MONOCYTES # BLD AUTO: 0.7 K/UL (ref 0.3–1)
MONOCYTES NFR BLD: 7.9 % (ref 4–15)
NEUTROPHILS # BLD AUTO: 5.6 K/UL (ref 1.8–7.7)
NEUTROPHILS NFR BLD: 67 % (ref 38–73)
NRBC BLD-RTO: 0 /100 WBC
PLATELET # BLD AUTO: 164 K/UL (ref 150–350)
PMV BLD AUTO: 10.2 FL (ref 9.2–12.9)
POTASSIUM SERPL-SCNC: 4 MMOL/L (ref 3.5–5.1)
RBC # BLD AUTO: 5.56 M/UL (ref 4.6–6.2)
SODIUM SERPL-SCNC: 138 MMOL/L (ref 136–145)
WBC # BLD AUTO: 8.4 K/UL (ref 3.9–12.7)

## 2019-11-20 PROCEDURE — 96360 HYDRATION IV INFUSION INIT: CPT

## 2019-11-20 PROCEDURE — 85025 COMPLETE CBC W/AUTO DIFF WBC: CPT

## 2019-11-20 PROCEDURE — 80048 BASIC METABOLIC PNL TOTAL CA: CPT

## 2019-11-20 PROCEDURE — 63600175 PHARM REV CODE 636 W HCPCS: Performed by: EMERGENCY MEDICINE

## 2019-11-20 PROCEDURE — 99284 EMERGENCY DEPT VISIT MOD MDM: CPT | Mod: 25

## 2019-11-20 PROCEDURE — 86901 BLOOD TYPING SEROLOGIC RH(D): CPT

## 2019-11-20 RX ORDER — IRBESARTAN 300 MG/1
300 TABLET ORAL NIGHTLY
COMMUNITY
End: 2023-02-17

## 2019-11-20 RX ADMIN — SODIUM CHLORIDE 1000 ML: 0.9 INJECTION, SOLUTION INTRAVENOUS at 08:11

## 2019-11-21 NOTE — ED PROVIDER NOTES
Encounter Date: 11/20/2019    SCRIBE #1 NOTE: I, Palomo Barragan, am scribing for, and in the presence of, Jose Roberto Smith MD. Other sections scribed: HPI, ROS, PE.       History     Chief Complaint   Patient presents with    Post Tonsilectomy Bleeding     has tonsils,adnoids, uvual, soft palat removed on Monday. Patient reports he's been bleeding since 1600pm this evening and it has not stopped.      This is a 43 y.o. Male with a PMHx of GERD who presents to the ED with complaints of bleeding from the throat that began after the pt had his tonsils, adnoids, uvula, and soft palate removed 9 days ago to treat sleep apnea. Patient reports he's been bleeding since 16:00 pm this evening and it has not stopped. He was asleep when the bleeding began and denies coughing prior to onset of bleeding.  States he is spitting out small clots.  Patient states he feels weak.  Denies near syncope.  No bleeding prior to this afternoon.  Pt reports nausea, generalized weakness, and light-headedness. He denies any pain, emesis, hematemesis, LOC, syncope, or any worsening or alleviating factors. Surgery performed by Dr Luis Antonio Rodriguez in [place]. This pt is allergic to Avelox.  .    The history is provided by the patient and medical records.     Review of patient's allergies indicates:   Allergen Reactions    Avelox [moxifloxacin] Anaphylaxis     Past Medical History:   Diagnosis Date    GERD (gastroesophageal reflux disease)     Hypertension      Past Surgical History:   Procedure Laterality Date    TONSILLECTOMY  11/11/2019     Family History   Family history unknown: Yes     Social History     Tobacco Use    Smoking status: Never Smoker    Smokeless tobacco: Never Used   Substance Use Topics    Alcohol use: Yes     Comment: occasional    Drug use: No     Review of Systems   Constitutional: Negative for chills, diaphoresis and fever.   HENT: Positive for postnasal drip and sore throat. Negative for congestion, nosebleeds, sinus  pressure, sinus pain, trouble swallowing and voice change.    Eyes: Negative.    Respiratory: Negative for cough and shortness of breath.    Cardiovascular: Negative for chest pain and palpitations.   Gastrointestinal: Positive for nausea. Negative for abdominal pain, diarrhea and vomiting.        NO melena or rectal bleeding   Genitourinary: Negative for dysuria, flank pain, frequency and testicular pain.   Neurological: Positive for weakness and light-headedness. Negative for dizziness, syncope, speech difficulty, numbness and headaches.        No numbness   All other systems reviewed and are negative.      Physical Exam     Initial Vitals   BP Pulse Resp Temp SpO2   11/20/19 1914 11/20/19 1914 11/20/19 1914 11/20/19 1914 11/20/19 1952   (!) 138/93 82 15 98 °F (36.7 °C) 96 %      MAP       --                Physical Exam    Nursing note and vitals reviewed.  Constitutional: He appears well-developed and well-nourished. He is not diaphoretic. No distress.   HENT:   Head: Normocephalic and atraumatic.   Nose: Nose normal.   There is eschar in bilateral tonsillar fossas.   There is mild bleeding from the left tonsillar fossa from the superior portion of the scar.  Voice is normal. Patient is handling secretions well.   Eyes: Conjunctivae and EOM are normal. Pupils are equal, round, and reactive to light. Right eye exhibits no discharge. Left eye exhibits no discharge. No scleral icterus.   Neck: Neck supple. No tracheal deviation present.   Cardiovascular: Normal rate, regular rhythm and normal heart sounds.   No murmur heard.  Pulmonary/Chest: Breath sounds normal. No stridor. No respiratory distress. He has no wheezes. He has no rhonchi. He has no rales.   Abdominal: Soft. He exhibits no distension. There is no tenderness. There is no rebound and no guarding.   Musculoskeletal: Normal range of motion. He exhibits no edema or tenderness.   Lymphadenopathy:     He has no cervical adenopathy.   Neurological: He is  alert and oriented to person, place, and time. He has normal strength.   Skin: Skin is warm and dry. No rash noted.   Psychiatric: He has a normal mood and affect. His behavior is normal. Judgment and thought content normal.         ED Course   Procedures  Labs Reviewed   BASIC METABOLIC PANEL - Abnormal; Notable for the following components:       Result Value    BUN, Bld 33 (*)     Anion Gap 7 (*)     All other components within normal limits   CBC W/ AUTO DIFFERENTIAL   TYPE & SCREEN          Imaging Results    None          Medical Decision Making:   History:   Old Medical Records: I decided to obtain old medical records.  Initial Assessment:   Patient presents with postoperative bleeding after tonsillectomy/adenoidectomy.  Mild bleeding noted from left tonsillar fossa.  Heart rate is normal. Blood pressure is normal. Will check CBC.  Will discuss with ENT service.  Differential Diagnosis:   Postoperative bleeding, anemia  Clinical Tests:   Lab Tests: Ordered and Reviewed  The following lab test(s) were unremarkable: CBC and BMP  ED Management:  2040:  No significant change lab work.  Mild tachycardia on standing.  Will give IV fluids.  Bleeding has slowed down.  Minimal blood present in left tonsillar fossa on repeat exam.  Awaiting callback from ENT.    2050:  Discussed with ENT at [facility].  Since symptoms are improving recommended continue observation.  If symptoms resolve spontaneous patient may follow up outpatient tomorrow with Dr. Rodriguez.  If symptoms progress will transfer patient to [facility] for observation admission and possible cauterization.  No other interventions recommended at this time.  Discussed plan with patient and family.  They are agreeable.    2135:  Bleeding stopped.  Patient tolerating p.o. May follow up as an outpatient tomorrow.  Will return if symptoms worsen or new symptoms develop.            Scribe Attestation:   Scribe #1: I performed the above scribed service and the  documentation accurately describes the services I performed. I attest to the accuracy of the note.                          Clinical Impression:       ICD-10-CM ICD-9-CM   1. Hemorrhage following tonsillectomy J95.830 998.11         Disposition:   Disposition: Discharged  Condition: Stable      I, Jose Roberto Smith MD, personally performed the services described in this documentation. All medical record entries made by the scribe were at my direction and in my presence.  I have reviewed the chart and agree that the record reflects my personal performance and is accurate and complete                    Jose Roberto Smith MD  11/20/19 [id number]

## 2019-11-21 NOTE — ED TRIAGE NOTES
Pt reports to ED via personal transportation with girlfriend with c/o bright red bleeding (with clots) from his throat starting at 4pm today; pt had tonsils, adenoids, and part of the soft palate and uvula removed last Monday 11/11/19; pt also reports pain to throat since sx but has been taking Ibuprofen 800mg (last dose 3:30pm); pt has emesis bag at bedside with small amt of bright red blood noted; pt AAOx4; will continue to monitor

## 2020-10-08 DIAGNOSIS — M23.92 INTERNAL DERANGEMENT OF LEFT KNEE: Primary | ICD-10-CM

## 2021-01-09 ENCOUNTER — HOSPITAL ENCOUNTER (EMERGENCY)
Facility: HOSPITAL | Age: 46
Discharge: HOME | End: 2021-01-09
Attending: EMERGENCY MEDICINE
Payer: COMMERCIAL

## 2021-01-09 VITALS
HEIGHT: 70 IN | OXYGEN SATURATION: 96 % | HEART RATE: 92 BPM | RESPIRATION RATE: 16 BRPM | WEIGHT: 249.13 LBS | SYSTOLIC BLOOD PRESSURE: 167 MMHG | BODY MASS INDEX: 35.66 KG/M2 | DIASTOLIC BLOOD PRESSURE: 108 MMHG | TEMPERATURE: 98 F

## 2021-01-09 DIAGNOSIS — S46.812A STRAIN OF LEFT TRAPEZIUS MUSCLE, INITIAL ENCOUNTER: ICD-10-CM

## 2021-01-09 DIAGNOSIS — S00.01XA ABRASION OF SCALP, INITIAL ENCOUNTER: Primary | ICD-10-CM

## 2021-01-09 PROCEDURE — 99284 EMERGENCY DEPT VISIT MOD MDM: CPT

## 2021-01-09 RX ORDER — ORPHENADRINE CITRATE 100 MG/1
100 TABLET, EXTENDED RELEASE ORAL 2 TIMES DAILY
Qty: 8 TABLET | Refills: 0 | Status: SHIPPED | OUTPATIENT
Start: 2021-01-09 | End: 2021-01-13

## 2021-01-09 RX ORDER — MELOXICAM 7.5 MG/1
7.5 TABLET ORAL DAILY
Qty: 12 TABLET | Refills: 0 | Status: SHIPPED | OUTPATIENT
Start: 2021-01-09 | End: 2023-02-17

## 2021-02-05 ENCOUNTER — HOSPITAL ENCOUNTER (EMERGENCY)
Facility: HOSPITAL | Age: 46
Discharge: HOME | End: 2021-02-05
Attending: EMERGENCY MEDICINE
Payer: COMMERCIAL

## 2021-02-05 VITALS
OXYGEN SATURATION: 97 % | WEIGHT: 250 LBS | RESPIRATION RATE: 19 BRPM | TEMPERATURE: 98 F | HEART RATE: 94 BPM | SYSTOLIC BLOOD PRESSURE: 157 MMHG | DIASTOLIC BLOOD PRESSURE: 87 MMHG | BODY MASS INDEX: 35.87 KG/M2

## 2021-02-05 DIAGNOSIS — T78.2XXA ANAPHYLAXIS, INITIAL ENCOUNTER: ICD-10-CM

## 2021-02-05 DIAGNOSIS — T78.40XA ALLERGIC REACTION, INITIAL ENCOUNTER: Primary | ICD-10-CM

## 2021-02-05 LAB
ALBUMIN SERPL BCP-MCNC: 4.4 G/DL (ref 3.5–5.2)
ALP SERPL-CCNC: 77 U/L (ref 55–135)
ALT SERPL W/O P-5'-P-CCNC: 30 U/L (ref 10–44)
ANION GAP SERPL CALC-SCNC: 9 MMOL/L (ref 8–16)
AST SERPL-CCNC: 36 U/L (ref 10–40)
BASOPHILS # BLD AUTO: 0.02 K/UL (ref 0–0.2)
BASOPHILS NFR BLD: 0.3 % (ref 0–1.9)
BILIRUB SERPL-MCNC: 0.4 MG/DL (ref 0.1–1)
BUN SERPL-MCNC: 19 MG/DL (ref 6–20)
CALCIUM SERPL-MCNC: 9 MG/DL (ref 8.7–10.5)
CHLORIDE SERPL-SCNC: 106 MMOL/L (ref 95–110)
CO2 SERPL-SCNC: 25 MMOL/L (ref 23–29)
CREAT SERPL-MCNC: 1.2 MG/DL (ref 0.5–1.4)
DIFFERENTIAL METHOD: NORMAL
EOSINOPHIL # BLD AUTO: 0.2 K/UL (ref 0–0.5)
EOSINOPHIL NFR BLD: 3.1 % (ref 0–8)
ERYTHROCYTE [DISTWIDTH] IN BLOOD BY AUTOMATED COUNT: 13 % (ref 11.5–14.5)
EST. GFR  (AFRICAN AMERICAN): >60 ML/MIN/1.73 M^2
EST. GFR  (NON AFRICAN AMERICAN): >60 ML/MIN/1.73 M^2
GLUCOSE SERPL-MCNC: 97 MG/DL (ref 70–110)
HCT VFR BLD AUTO: 48.4 % (ref 40–54)
HGB BLD-MCNC: 16.2 G/DL (ref 14–18)
IMM GRANULOCYTES # BLD AUTO: 0.01 K/UL (ref 0–0.04)
IMM GRANULOCYTES NFR BLD AUTO: 0.2 % (ref 0–0.5)
LYMPHOCYTES # BLD AUTO: 2.1 K/UL (ref 1–4.8)
LYMPHOCYTES NFR BLD: 36.2 % (ref 18–48)
MCH RBC QN AUTO: 28.1 PG (ref 27–31)
MCHC RBC AUTO-ENTMCNC: 33.5 G/DL (ref 32–36)
MCV RBC AUTO: 84 FL (ref 82–98)
MONOCYTES # BLD AUTO: 0.5 K/UL (ref 0.3–1)
MONOCYTES NFR BLD: 8.7 % (ref 4–15)
NEUTROPHILS # BLD AUTO: 3 K/UL (ref 1.8–7.7)
NEUTROPHILS NFR BLD: 51.5 % (ref 38–73)
NRBC BLD-RTO: 0 /100 WBC
PLATELET # BLD AUTO: 155 K/UL (ref 150–350)
PMV BLD AUTO: 10.2 FL (ref 9.2–12.9)
POTASSIUM SERPL-SCNC: 4.1 MMOL/L (ref 3.5–5.1)
PROT SERPL-MCNC: 7.8 G/DL (ref 6–8.4)
RBC # BLD AUTO: 5.76 M/UL (ref 4.6–6.2)
SODIUM SERPL-SCNC: 140 MMOL/L (ref 136–145)
WBC # BLD AUTO: 5.75 K/UL (ref 3.9–12.7)

## 2021-02-05 PROCEDURE — 96372 THER/PROPH/DIAG INJ SC/IM: CPT | Mod: 59

## 2021-02-05 PROCEDURE — 25000003 PHARM REV CODE 250: Performed by: EMERGENCY MEDICINE

## 2021-02-05 PROCEDURE — 25000003 PHARM REV CODE 250

## 2021-02-05 PROCEDURE — 63600175 PHARM REV CODE 636 W HCPCS

## 2021-02-05 PROCEDURE — 80053 COMPREHEN METABOLIC PANEL: CPT

## 2021-02-05 PROCEDURE — 96374 THER/PROPH/DIAG INJ IV PUSH: CPT

## 2021-02-05 PROCEDURE — 99284 EMERGENCY DEPT VISIT MOD MDM: CPT | Mod: 25

## 2021-02-05 PROCEDURE — 85025 COMPLETE CBC W/AUTO DIFF WBC: CPT

## 2021-02-05 PROCEDURE — 96375 TX/PRO/DX INJ NEW DRUG ADDON: CPT

## 2021-02-05 RX ORDER — METHYLPREDNISOLONE SOD SUCC 125 MG
125 VIAL (EA) INJECTION
Status: COMPLETED | OUTPATIENT
Start: 2021-02-05 | End: 2021-02-05

## 2021-02-05 RX ORDER — EPINEPHRINE 0.3 MG/.3ML
INJECTION SUBCUTANEOUS
Status: COMPLETED
Start: 2021-02-05 | End: 2021-02-05

## 2021-02-05 RX ORDER — EPINEPHRINE 0.3 MG/.3ML
0.3 INJECTION SUBCUTANEOUS
Status: COMPLETED | OUTPATIENT
Start: 2021-02-05 | End: 2021-02-05

## 2021-02-05 RX ORDER — EPINEPHRINE 0.3 MG/.3ML
1 INJECTION SUBCUTANEOUS
Qty: 2 EACH | Refills: 1 | Status: SHIPPED | OUTPATIENT
Start: 2021-02-05 | End: 2022-02-05

## 2021-02-05 RX ORDER — FAMOTIDINE 10 MG/ML
20 INJECTION INTRAVENOUS
Status: COMPLETED | OUTPATIENT
Start: 2021-02-05 | End: 2021-02-05

## 2021-02-05 RX ORDER — METHYLPREDNISOLONE SOD SUCC 125 MG
VIAL (EA) INJECTION
Status: COMPLETED
Start: 2021-02-05 | End: 2021-02-05

## 2021-02-05 RX ADMIN — EPINEPHRINE 0.3 MG: 0.3 INJECTION SUBCUTANEOUS at 06:02

## 2021-02-05 RX ADMIN — METHYLPREDNISOLONE SODIUM SUCCINATE 125 MG: 125 INJECTION, POWDER, FOR SOLUTION INTRAMUSCULAR; INTRAVENOUS at 06:02

## 2021-02-05 RX ADMIN — Medication 125 MG: at 06:02

## 2021-02-05 RX ADMIN — FAMOTIDINE 20 MG: 10 INJECTION INTRAVENOUS at 07:02

## 2021-02-05 RX ADMIN — EPINEPHRINE 0.3 MG: 0.3 INJECTION INTRAMUSCULAR at 06:02

## 2023-02-17 ENCOUNTER — HOSPITAL ENCOUNTER (OUTPATIENT)
Facility: HOSPITAL | Age: 48
Discharge: HOME | End: 2023-02-17
Attending: EMERGENCY MEDICINE | Admitting: EMERGENCY MEDICINE
Payer: COMMERCIAL

## 2023-02-17 VITALS
SYSTOLIC BLOOD PRESSURE: 127 MMHG | RESPIRATION RATE: 17 BRPM | OXYGEN SATURATION: 98 % | DIASTOLIC BLOOD PRESSURE: 68 MMHG | BODY MASS INDEX: 34.3 KG/M2 | TEMPERATURE: 98 F | WEIGHT: 245 LBS | HEIGHT: 71 IN | HEART RATE: 82 BPM

## 2023-02-17 DIAGNOSIS — I10 PRIMARY HYPERTENSION: ICD-10-CM

## 2023-02-17 DIAGNOSIS — T50.901A ACCIDENTAL DRUG OVERDOSE, INITIAL ENCOUNTER: Primary | ICD-10-CM

## 2023-02-17 DIAGNOSIS — R42 LIGHTHEADEDNESS: ICD-10-CM

## 2023-02-17 LAB
ALBUMIN SERPL BCP-MCNC: 4.3 G/DL (ref 3.5–5.2)
ALP SERPL-CCNC: 60 U/L (ref 55–135)
ALT SERPL W/O P-5'-P-CCNC: 36 U/L (ref 10–44)
ANION GAP SERPL CALC-SCNC: 6 MMOL/L (ref 8–16)
AST SERPL-CCNC: 34 U/L (ref 10–40)
BASOPHILS # BLD AUTO: 0.03 K/UL (ref 0–0.2)
BASOPHILS NFR BLD: 0.4 % (ref 0–1.9)
BILIRUB SERPL-MCNC: 0.4 MG/DL (ref 0.1–1)
BUN SERPL-MCNC: 23 MG/DL (ref 6–20)
CALCIUM SERPL-MCNC: 9.7 MG/DL (ref 8.7–10.5)
CHLORIDE SERPL-SCNC: 105 MMOL/L (ref 95–110)
CO2 SERPL-SCNC: 26 MMOL/L (ref 23–29)
CREAT SERPL-MCNC: 1.2 MG/DL (ref 0.5–1.4)
DIFFERENTIAL METHOD: ABNORMAL
EOSINOPHIL # BLD AUTO: 0.2 K/UL (ref 0–0.5)
EOSINOPHIL NFR BLD: 2.9 % (ref 0–8)
ERYTHROCYTE [DISTWIDTH] IN BLOOD BY AUTOMATED COUNT: 13.8 % (ref 11.5–14.5)
EST. GFR  (NO RACE VARIABLE): >60 ML/MIN/1.73 M^2
GLUCOSE SERPL-MCNC: 99 MG/DL (ref 70–110)
HCT VFR BLD AUTO: 53.4 % (ref 40–54)
HGB BLD-MCNC: 18.4 G/DL (ref 14–18)
IMM GRANULOCYTES # BLD AUTO: 0.04 K/UL (ref 0–0.04)
IMM GRANULOCYTES NFR BLD AUTO: 0.6 % (ref 0–0.5)
LYMPHOCYTES # BLD AUTO: 1.3 K/UL (ref 1–4.8)
LYMPHOCYTES NFR BLD: 17.7 % (ref 18–48)
MCH RBC QN AUTO: 29.3 PG (ref 27–31)
MCHC RBC AUTO-ENTMCNC: 34.5 G/DL (ref 32–36)
MCV RBC AUTO: 85 FL (ref 82–98)
MONOCYTES # BLD AUTO: 0.6 K/UL (ref 0.3–1)
MONOCYTES NFR BLD: 8.3 % (ref 4–15)
NEUTROPHILS # BLD AUTO: 5.1 K/UL (ref 1.8–7.7)
NEUTROPHILS NFR BLD: 70.1 % (ref 38–73)
NRBC BLD-RTO: 0 /100 WBC
PLATELET # BLD AUTO: 153 K/UL (ref 150–450)
PMV BLD AUTO: 10.1 FL (ref 9.2–12.9)
POCT GLUCOSE: 100 MG/DL (ref 70–110)
POCT GLUCOSE: 102 MG/DL (ref 70–110)
POCT GLUCOSE: 87 MG/DL (ref 70–110)
POCT GLUCOSE: 89 MG/DL (ref 70–110)
POCT GLUCOSE: 91 MG/DL (ref 70–110)
POTASSIUM SERPL-SCNC: 4.1 MMOL/L (ref 3.5–5.1)
PROT SERPL-MCNC: 8 G/DL (ref 6–8.4)
RBC # BLD AUTO: 6.29 M/UL (ref 4.6–6.2)
SODIUM SERPL-SCNC: 137 MMOL/L (ref 136–145)
WBC # BLD AUTO: 7.22 K/UL (ref 3.9–12.7)

## 2023-02-17 PROCEDURE — 82962 GLUCOSE BLOOD TEST: CPT

## 2023-02-17 PROCEDURE — 80053 COMPREHEN METABOLIC PANEL: CPT | Performed by: PHYSICIAN ASSISTANT

## 2023-02-17 PROCEDURE — 85025 COMPLETE CBC W/AUTO DIFF WBC: CPT | Performed by: PHYSICIAN ASSISTANT

## 2023-02-17 PROCEDURE — 99285 EMERGENCY DEPT VISIT HI MDM: CPT | Mod: 25

## 2023-02-17 PROCEDURE — G0378 HOSPITAL OBSERVATION PER HR: HCPCS

## 2023-02-17 RX ORDER — VALACYCLOVIR HYDROCHLORIDE 500 MG/1
500 TABLET, FILM COATED ORAL DAILY
COMMUNITY
Start: 2022-11-07

## 2023-02-17 RX ORDER — IRBESARTAN 300 MG/1
300 TABLET ORAL DAILY
Qty: 30 TABLET | Refills: 3 | Status: SHIPPED | OUTPATIENT
Start: 2023-02-17 | End: 2024-02-17

## 2023-02-17 RX ORDER — ESOMEPRAZOLE MAGNESIUM 40 MG/1
40 CAPSULE, DELAYED RELEASE ORAL EVERY MORNING
COMMUNITY
Start: 2022-12-16

## 2023-02-17 RX ORDER — ALPRAZOLAM 0.5 MG/1
0.5 TABLET ORAL DAILY PRN
COMMUNITY
Start: 2022-12-13

## 2023-02-17 RX ORDER — TIRZEPATIDE 2.5 MG/.5ML
INJECTION, SOLUTION SUBCUTANEOUS
COMMUNITY
Start: 2022-12-13 | End: 2023-02-17

## 2023-02-17 RX ORDER — TADALAFIL 5 MG/1
5 TABLET ORAL
COMMUNITY
Start: 2023-02-09

## 2023-02-17 RX ORDER — ACETAMINOPHEN 325 MG/1
650 TABLET ORAL EVERY 4 HOURS PRN
Status: DISCONTINUED | OUTPATIENT
Start: 2023-02-17 | End: 2023-02-17 | Stop reason: HOSPADM

## 2023-02-17 RX ORDER — TESTOSTERONE CYPIONATE 200 MG/ML
200 INJECTION, SOLUTION INTRAMUSCULAR
COMMUNITY
Start: 2022-12-13

## 2023-02-17 RX ORDER — SODIUM CHLORIDE 0.9 % (FLUSH) 0.9 %
10 SYRINGE (ML) INJECTION
Status: DISCONTINUED | OUTPATIENT
Start: 2023-02-17 | End: 2023-02-17 | Stop reason: HOSPADM

## 2023-02-17 RX ORDER — IRBESARTAN AND HYDROCHLOROTHIAZIDE 300; 12.5 MG/1; MG/1
1 TABLET, FILM COATED ORAL EVERY MORNING
COMMUNITY
Start: 2022-12-13 | End: 2023-02-17

## 2023-02-17 NOTE — ED PROVIDER NOTES
Encounter Date: 2/17/2023    SCRIBE #1 NOTE: I, Rosa Nuno, am scribing for, and in the presence of,  Wiht Ho PA-C. I have scribed the following portions of the note - Other sections scribed: HPI, ROS, PE.     History     Chief Complaint   Patient presents with    Drug Overdose     Patient is concerned states took larger dose of Ozempic that what was ordered, is nervous about effects, denies N/V/D or SOB, no hives or rashes.      This 47 y.o male, with a medical history of HTN GERD presents to the ED c/o a possible drug overdose. Pt reports that he was attempting to take his Ozempic at approximately 0900 today when the pen malfunctioned and some of the liquid spilled out of the bottom. He states he accidentally injected the rest of the entire pen (2 mg/1.5 mL). He states that he began to feel lightheadedness a few minutes ago, but is fine otherwise. Denies fever, cp sob dizziness abdominal pain nausea, emesis or any other associated symptoms.     Was taking mounjaro for the past 5-6 weeks previously for weight loss.     The history is provided by the patient.   Review of patient's allergies indicates:   Allergen Reactions    Avelox [moxifloxacin] Anaphylaxis     Past Medical History:   Diagnosis Date    GERD (gastroesophageal reflux disease)     Hypertension      Past Surgical History:   Procedure Laterality Date    TONSILLECTOMY  11/11/2019     Family History   Family history unknown: Yes     Social History     Tobacco Use    Smoking status: Never    Smokeless tobacco: Never   Substance Use Topics    Alcohol use: Yes     Comment: 2/17/23: Socially    Drug use: No     Review of Systems   Constitutional:  Negative for chills and fever.   HENT:  Negative for congestion, ear pain, rhinorrhea and sore throat.    Eyes:  Negative for redness.   Respiratory:  Negative for shortness of breath and stridor.    Cardiovascular:  Negative for chest pain.   Gastrointestinal:  Negative for abdominal pain,  constipation, diarrhea, nausea and vomiting.   Genitourinary:  Negative for dysuria, frequency, hematuria and urgency.   Musculoskeletal:  Negative for back pain and neck pain.   Skin:  Negative for rash.   Neurological:  Positive for light-headedness. Negative for dizziness, speech difficulty, weakness, numbness and headaches.   Hematological:  Does not bruise/bleed easily.   Psychiatric/Behavioral:  Negative for confusion.      Physical Exam     Initial Vitals [02/17/23 0909]   BP Pulse Resp Temp SpO2   (!) 126/92 83 18 97.6 °F (36.4 °C) 99 %      MAP       --         Physical Exam    Nursing note and vitals reviewed.  Constitutional: He appears well-developed and well-nourished. He is not diaphoretic. No distress.   HENT:   Head: Normocephalic and atraumatic.   Eyes: Conjunctivae are normal.   Neck:   Normal range of motion.  Cardiovascular:  Normal rate, regular rhythm and normal heart sounds.     Exam reveals no gallop and no friction rub.       No murmur heard.  Pulmonary/Chest: Breath sounds normal. No respiratory distress. He has no wheezes. He has no rhonchi. He has no rales.   Abdominal: Abdomen is soft. He exhibits no distension. There is no abdominal tenderness. There is no rebound and no guarding.   Musculoskeletal:         General: No edema.      Cervical back: Normal range of motion.     Neurological: He is alert and oriented to person, place, and time.   Skin: Skin is warm and dry.   Psychiatric: He has a normal mood and affect.       ED Course   Procedures  Labs Reviewed   CBC W/ AUTO DIFFERENTIAL - Abnormal; Notable for the following components:       Result Value    RBC 6.29 (*)     Hemoglobin 18.4 (*)     Immature Granulocytes 0.6 (*)     Lymph % 17.7 (*)     All other components within normal limits   COMPREHENSIVE METABOLIC PANEL - Abnormal; Notable for the following components:    BUN 23 (*)     Anion Gap 6 (*)     All other components within normal limits   POCT GLUCOSE   POCT GLUCOSE   POCT  GLUCOSE MONITORING CONTINUOUS   POCT GLUCOSE MONITORING CONTINUOUS   POCT GLUCOSE MONITORING CONTINUOUS   POCT GLUCOSE MONITORING CONTINUOUS   POCT GLUCOSE MONITORING CONTINUOUS          Imaging Results    None          Medications   sodium chloride 0.9% flush 10 mL (has no administration in time range)   acetaminophen tablet 650 mg (has no administration in time range)     Medical Decision Making:   Clinical Tests:   Lab Tests: Reviewed and Ordered  ED Management:  47-year-old male with history of hypertension GERD presenting for evaluation of possible drug overdose.  He reports he injected almost the entire 2 mg/1.5 mL ozempic pen     Complaining of lightheadedness.  Glucose 89 in triage upon arrival.  CBC and CMP unremarkable        Discussed with poison control who recommends close observation for 8-12 hours for signs of hypoglycemia due to half life of ozempic.     Discussed with  who accepts pt for observation sera Duran   Discussed with Dr. Guerra who agrees with assessment and plan.         Scribe Attestation:   Scribe #1: I performed the above scribed service and the documentation accurately describes the services I performed. I attest to the accuracy of the note.                 I, Whit Ho PA-C , personally performed the services described in this documentation. All medical record entries made by the scribe were at my direction and in my presence. I have reviewed the chart and agree that the record reflects my personal performance and is accurate and complete.   Clinical Impression:   Final diagnoses:  [T50.901A] Accidental drug overdose, initial encounter (Primary)  [R42] Lightheadedness        ED Disposition Condition    Observation Stable                Whit Ho PA-C  02/17/23 [id number]

## 2023-02-17 NOTE — HOSPITAL COURSE
Admission to observation for accidental use of ozempic (new medication-see above HPI). He on Ozempic for weight loss. No signs or symptoms of hypoglycemia. He now understand how to use Ozempic. If no further hypoglycemia, then okay home this afternoon.   All findings and plan were explained to the patient. All questions and concerns were answered. Patient verbalized understanding. Patient is in stable condition to d/c home and has been informed to follow up with PCP.

## 2023-02-17 NOTE — PLAN OF CARE
[facility] - Emergency Dept  Discharge Assessment    Primary Care Provider: La Tsai MD     Discharge Assessment (most recent)       BRIEF DISCHARGE ASSESSMENT - 02/17/23 1154          Discharge Planning    Assessment Type Discharge Planning Brief Assessment     Resource/Environmental Concerns none     Support Systems Children     Assistance Needed none     Equipment Currently Used at Home none     Current Living Arrangements home     Care Facility Name n/a     Patient/Family Anticipates Transition to home     Patient/Family Anticipated Services at Transition outpatient care     DME Needed Upon Discharge  none     Discharge Plan A Home     Discharge Plan B Home

## 2023-02-17 NOTE — SUBJECTIVE & OBJECTIVE
Past Medical History:   Diagnosis Date    GERD (gastroesophageal reflux disease)     Hypertension        Past Surgical History:   Procedure Laterality Date    TONSILLECTOMY  2019       Review of patient's allergies indicates:   Allergen Reactions    Avelox [moxifloxacin] Anaphylaxis       No current facility-administered medications on file prior to encounter.     Current Outpatient Medications on File Prior to Encounter   Medication Sig    ALPRAZolam (XANAX) 0.5 MG tablet Take 0.5 mg by mouth daily as needed.    irbesartan-hydrochlorothiazide (AVALIDE) 300-12.5 mg per tablet Take 1 tablet by mouth every morning.    MOUNJARO 2.5 mg/0.5 mL PnIj SMARTSI.5 Milligram(s) SUB-Q Once a Week    semaglutide (OZEMPIC SUBQ) Inject into the skin.    tadalafiL (CIALIS) 5 MG tablet Take 5 mg by mouth.    valACYclovir (VALTREX) 500 MG tablet Take 500 mg by mouth once daily.    EPINEPHrine (EPIPEN) 0.3 mg/0.3 mL AtIn Inject 0.3 mLs (0.3 mg total) into the muscle as needed. Then present immediately to emergency room    esomeprazole (NEXIUM) 40 MG capsule Take 40 mg by mouth every morning.    testosterone cypionate (DEPOTESTOTERONE CYPIONATE) 200 mg/mL injection Inject 200 mg into the muscle every 7 days.    [DISCONTINUED] hydrochlorothiazide (HYDRODIURIL) 25 MG tablet Take 1 tablet (25 mg total) by mouth once daily.    [DISCONTINUED] irbesartan (AVAPRO) 300 MG tablet Take 300 mg by mouth every evening.    [DISCONTINUED] meloxicam (MOBIC) 7.5 MG tablet Take 1 tablet (7.5 mg total) by mouth once daily.     Family History       Family history is unknown by patient.          Tobacco Use    Smoking status: Never    Smokeless tobacco: Never   Substance and Sexual Activity    Alcohol use: Yes     Comment: 23: Socially    Drug use: No    Sexual activity: Not on file     Review of Systems   Constitutional:  Negative for activity change.   Respiratory: Negative.     Gastrointestinal: Negative.    Genitourinary: Negative.     Musculoskeletal: Negative.    Neurological: Negative.    Hematological: Negative.    Objective:     Vital Signs (Most Recent):  Temp: 97.6 °F (36.4 °C) (02/17/23 0909)  Pulse: 83 (02/17/23 0909)  Resp: 18 (02/17/23 0909)  BP: (!) 126/92 (02/17/23 0909)  SpO2: 99 % (02/17/23 0909)   Vital Signs (24h Range):  Temp:  [97.6 °F (36.4 °C)] 97.6 °F (36.4 °C)  Pulse:  [83] 83  Resp:  [18] 18  SpO2:  [99 %] 99 %  BP: (126)/(92) 126/92     Weight: 111.1 kg (245 lb)  Body mass index is 34.17 kg/m².    Physical Exam  Constitutional:       Appearance: Normal appearance. He is not ill-appearing.   HENT:      Head: Atraumatic.      Nose: Nose normal.      Mouth/Throat:      Mouth: Mucous membranes are dry.   Cardiovascular:      Rate and Rhythm: Normal rate and regular rhythm.      Heart sounds: Normal heart sounds.   Pulmonary:      Effort: Pulmonary effort is normal.      Breath sounds: Normal breath sounds.   Abdominal:      General: Abdomen is flat.      Palpations: Abdomen is soft.   Musculoskeletal:         General: Normal range of motion.      Cervical back: Normal range of motion and neck supple.      Right lower leg: No edema.      Left lower leg: No edema.   Skin:     General: Skin is warm and dry.   Neurological:      General: No focal deficit present.      Mental Status: He is alert and oriented to person, place, and time.   Psychiatric:         Mood and Affect: Mood normal.           Significant Labs: All pertinent labs within the past 24 hours have been reviewed.    Significant Imaging: I have reviewed all pertinent imaging results/findings within the past 24 hours.

## 2023-02-17 NOTE — PHARMACY MED REC
"Admission Medication History     The home medication history was taken by Melinda Chavez CPhT.      You may go to "Admission" then "Reconcile Home Medications" tabs to review and/or act upon these items.     The home medication list has been updated by the Pharmacy department.   Please read ALL comments highlighted in yellow.   Please address this information as you see fit.    Feel free to contact us if you have any questions or require assistance.      The medications listed below were removed from the home medication list. Please reorder if appropriate:  Patient reports no longer taking the following medication(s):  Hydrodiuril 25 mg tab  Avapro 300 mg tab  Mobic 7.5 mg tab      Medications listed below were obtained from: Patient/family and Analytic software- [employer]  (Not in a hospital admission)      Potential issues to be addressed PRIOR TO DISCHARGE  Patient requested refills for the following medications: (Epinephrine (epipen 0.3 mg/0.3 mL )    Patient stated he purchased Ozempic (OTC) but does not how much he injected or what strength was purchased.  Last dose today.    Melinda Chavez CPhT.  [phone]                .        "

## 2023-02-17 NOTE — H&P
[facility]  [facility] Medicine  History & Physical    Patient Name: Bernabe Hale  MRN: [id number]  Patient Class: OP- Observation  Admission Date: 2023  Attending Physician: Carlos Enrique Starr MD   Primary Care Provider: La Tsai MD         Patient information was obtained from patient and ER records.     Subjective:     Principal Problem:<principal problem not specified>    Chief Complaint:   Chief Complaint   Patient presents with    Drug Overdose     Patient is concerned states took larger dose of Ozempic that what was ordered, is nervous about effects, denies N/V/D or SOB, no hives or rashes.         HPI: Bernabe Hale is a 48 yo male with significant history for hypertension and obesity who present to ED for concern for hypoglycemia. Patient is not a diabetic but takes mounjaro for weight lost. Was switch mounjaro to ozempic yesterday. He inject himself twice with and got nervous as realized he gave himself too much. Denies headaches, dizziness, change in vision, chest pain shortness of breath, palpitations, diaphoresis, orthopnea, PND, abdominal pain, nausea, vomiting, or extremities weakness/numbness. Poison control was called by ED who recommended observation for 8-12 hours. Patient requesting to go home .       Past Medical History:   Diagnosis Date    GERD (gastroesophageal reflux disease)     Hypertension        Past Surgical History:   Procedure Laterality Date    TONSILLECTOMY  2019       Review of patient's allergies indicates:   Allergen Reactions    Avelox [moxifloxacin] Anaphylaxis       No current facility-administered medications on file prior to encounter.     Current Outpatient Medications on File Prior to Encounter   Medication Sig    ALPRAZolam (XANAX) 0.5 MG tablet Take 0.5 mg by mouth daily as needed.    irbesartan-hydrochlorothiazide (AVALIDE) 300-12.5 mg per tablet Take 1 tablet by mouth every morning.    MOUNJARO 2.5 mg/0.5 mL PnIj SMARTSI.5  Milligram(s) SUB-Q Once a Week    semaglutide (OZEMPIC SUBQ) Inject into the skin.    tadalafiL (CIALIS) 5 MG tablet Take 5 mg by mouth.    valACYclovir (VALTREX) 500 MG tablet Take 500 mg by mouth once daily.    EPINEPHrine (EPIPEN) 0.3 mg/0.3 mL AtIn Inject 0.3 mLs (0.3 mg total) into the muscle as needed. Then present immediately to emergency room    esomeprazole (NEXIUM) 40 MG capsule Take 40 mg by mouth every morning.    testosterone cypionate (DEPOTESTOTERONE CYPIONATE) 200 mg/mL injection Inject 200 mg into the muscle every 7 days.    [DISCONTINUED] hydrochlorothiazide (HYDRODIURIL) 25 MG tablet Take 1 tablet (25 mg total) by mouth once daily.    [DISCONTINUED] irbesartan (AVAPRO) 300 MG tablet Take 300 mg by mouth every evening.    [DISCONTINUED] meloxicam (MOBIC) 7.5 MG tablet Take 1 tablet (7.5 mg total) by mouth once daily.     Family History       Family history is unknown by patient.          Tobacco Use    Smoking status: Never    Smokeless tobacco: Never   Substance and Sexual Activity    Alcohol use: Yes     Comment: 2/17/23: Socially    Drug use: No    Sexual activity: Not on file     Review of Systems   Constitutional:  Negative for activity change.   Respiratory: Negative.     Gastrointestinal: Negative.    Genitourinary: Negative.    Musculoskeletal: Negative.    Neurological: Negative.    Hematological: Negative.    Objective:     Vital Signs (Most Recent):  Temp: 97.6 °F (36.4 °C) (02/17/23 0909)  Pulse: 83 (02/17/23 0909)  Resp: 18 (02/17/23 0909)  BP: (!) 126/92 (02/17/23 0909)  SpO2: 99 % (02/17/23 0909)   Vital Signs (24h Range):  Temp:  [97.6 °F (36.4 °C)] 97.6 °F (36.4 °C)  Pulse:  [83] 83  Resp:  [18] 18  SpO2:  [99 %] 99 %  BP: (126)/(92) 126/92     Weight: 111.1 kg (245 lb)  Body mass index is 34.17 kg/m².    Physical Exam  Constitutional:       Appearance: Normal appearance. He is not ill-appearing.   HENT:      Head: Atraumatic.      Nose: Nose normal.       Mouth/Throat:      Mouth: Mucous membranes are dry.   Cardiovascular:      Rate and Rhythm: Normal rate and regular rhythm.      Heart sounds: Normal heart sounds.   Pulmonary:      Effort: Pulmonary effort is normal.      Breath sounds: Normal breath sounds.   Abdominal:      General: Abdomen is flat.      Palpations: Abdomen is soft.   Musculoskeletal:         General: Normal range of motion.      Cervical back: Normal range of motion and neck supple.      Right lower leg: No edema.      Left lower leg: No edema.   Skin:     General: Skin is warm and dry.   Neurological:      General: No focal deficit present.      Mental Status: He is alert and oriented to person, place, and time.   Psychiatric:         Mood and Affect: Mood normal.           Significant Labs: All pertinent labs within the past 24 hours have been reviewed.    Significant Imaging: I have reviewed all pertinent imaging results/findings within the past 24 hours.    Assessment/Plan:     Primary hypertension  Controlled.  Continue home irbesartan. Hold hctz.       Accidental drug overdose  Admission to observation for accidental use of ozempic (new medication-see above HPI). He on Ozempic for weight loss. No signs or symptoms of hypoglycemia. He now understand how to use Ozempic. Continue to monitor for hypoglycemia until 4 pm today.         VTE Risk Mitigation (From admission, onward)         Ordered     IP VTE HIGH RISK PATIENT  Once         02/17/23 0954     Place sequential compression device  Until discontinued         02/17/23 0954               As clarification on 2/17/2023, patient admit to observation under my care in collaboration with Dr. Carlos Enrique Starr MD.     Renee Braun NP  Department of Hospital Medicine   [facility] - Emergency Dept

## 2023-02-17 NOTE — HPI
Bernabe Hale is a 46 yo male with significant history for hypertension and obesity who present to ED for concern for hypoglycemia. Patient is not a diabetic but takes mounjaro for weight lost. Was switch mounjaro to ozempic yesterday. He inject himself twice with and got nervous as realized he gave himself too much. Denies headaches, dizziness, change in vision, chest pain shortness of breath, palpitations, diaphoresis, orthopnea, PND, abdominal pain, nausea, vomiting, or extremities weakness/numbness. Poison control was called by ED who recommended observation for 8-12 hours. Patient requesting to go home .

## 2023-02-17 NOTE — ED NOTES
46 yo male to ED for accidentally injecting too much of his insulin. Pt started taking Ozempic this morning, says he wasn't sure how to use the new pen so he just kept injecting himself w/ the med until the pen was empty. Then he realized it wasn't the correct way. Ozempic 2mg/1.5ml pen. Pt does feel lightheaded and a little jittery. Denies SI, CP, SOB, N/V/D. Pt is ambulatory, VSS, NAD, AAOx4

## 2023-02-17 NOTE — DISCHARGE SUMMARY
[facility] [place] [facility]  Hospital Medicine  Discharge Summary      Patient Name: Bernabe Hale  MRN: [id number]  BRINA: [id number]  Patient Class: OP- Observation  Admission Date: 2/17/2023  Hospital Length of Stay: 0 days  Discharge Date and Time:  02/17/2023 12:04 PM  Attending Physician: Carlos Enrique Starr MD   Discharging Provider: Renee Falcon NP  Primary Care Provider: La Tsai MD    Primary Care Team: RENEE FALCON    HPI:   Bernabe Hale is a 46 yo male with significant history for hypertension and obesity who present to ED for concern for hypoglycemia. Patient is not a diabetic but takes mounjaro for weight lost. Was switch mounjaro to ozempic yesterday. He inject himself twice with and got nervous as realized he gave himself too much. Denies headaches, dizziness, change in vision, chest pain shortness of breath, palpitations, diaphoresis, orthopnea, PND, abdominal pain, nausea, vomiting, or extremities weakness/numbness. Poison control was called by ED who recommended observation for 8-12 hours. Patient requesting to go home .       * No surgery found *      Hospital Course:   Admission to observation for accidental use of ozempic (new medication-see above HPI). He on Ozempic for weight loss. No signs or symptoms of hypoglycemia. He now understand how to use Ozempic. If no further hypoglycemia, then okay home this afternoon. Noted elevated BUN. Hold Hctz, continue irbesartan.   All findings and plan were explained to the patient. All questions and concerns were answered. Patient verbalized understanding. Patient is in stable condition to d/c home and has been informed to follow up with PCP.        Goals of Care Treatment Preferences:  Code Status: Full Code      Consults:     Cardiac/Vascular  Primary hypertension  Controlled.  Continue home irbesartan. Hold hctz.       Other  Accidental drug overdose  Admission to observation for accidental use of ozempic (new medication-see above HPI). He on  Ozempic for weight loss. No signs or symptoms of hypoglycemia. He now understand how to use Ozempic. Continue to monitor for hypoglycemia until 4 pm today.         Final Active Diagnoses:    Diagnosis Date Noted POA    Accidental drug overdose [T50.901A] 02/17/2023 Yes    Primary hypertension [I10] 02/17/2023 Yes      Problems Resolved During this Admission:       Discharged Condition: good    Disposition:     Follow Up:   Follow-up Information       La Tsai MD Follow up on 2/20/2023.    Specialty: Internal Medicine  Why: Keep appointment as previously scheduled.  Contact information:  [address] [facility] [place] [address] [phone]                           Patient Instructions:   No discharge procedures on file.    Significant Diagnostic Studies: Labs: All labs within the past 24 hours have been reviewed    Pending Diagnostic Studies:       None           Medications:  Reconciled Home Medications:      Medication List        START taking these medications      irbesartan 300 MG tablet  Commonly known as: AVAPRO  Take 1 tablet (300 mg total) by mouth once daily.            CONTINUE taking these medications      ALPRAZolam 0.5 MG tablet  Commonly known as: XANAX  Take 0.5 mg by mouth daily as needed.     EPINEPHrine 0.3 mg/0.3 mL Atin  Commonly known as: EPIPEN  Inject 0.3 mLs (0.3 mg total) into the muscle as needed. Then present immediately to emergency room     esomeprazole 40 MG capsule  Commonly known as: NEXIUM  Take 40 mg by mouth every morning.     OZEMPIC SUBQ  Inject into the skin.     tadalafiL 5 MG tablet  Commonly known as: CIALIS  Take 5 mg by mouth.     testosterone cypionate 200 mg/mL injection  Commonly known as: DEPOTESTOTERONE CYPIONATE  Inject 200 mg into the muscle every 7 days.     valACYclovir 500 MG tablet  Commonly known as: VALTREX  Take 500 mg by mouth once daily.            STOP taking these medications      irbesartan-hydrochlorothiazide 300-12.5 mg per  tablet  Commonly known as: AVALIDE     MOUNJARO 2.5 mg/0.5 mL Pnij  Generic drug: tirzepatide              Indwelling Lines/Drains at time of discharge:   Lines/Drains/Airways       None                   Time spent on the discharge of patient: 30 minutes         Renee Braun NP  Department of Hospital Medicine  [facility] [place] - Emergency Dept

## 2023-02-17 NOTE — PLAN OF CARE
[place] - Emergency Dept  Discharge Final Note    Primary Care Provider: La Tsai MD    Expected Discharge Date: 02/17/2023    Final Discharge Note (most recent)       Final Note - 02/17/23 1155          Final Note    Assessment Type Final Discharge Note     Anticipated Discharge Disposition Home or Self Care     What phone number can be called within the next 1-3 days to see how you are doing after discharge? [phone]     Hospital Resources/Appts/Education Provided Post-Acute resouces added to AVS        Post-Acute Status    Discharge Delays None known at this time                     Important Message from Medicare             Contact Info       La Tsai MD   Specialty: Internal Medicine   Relationship: PCP - General    [address] [facility] [place] [address]   Phone: [phone]       Next Steps: Follow up on 2/20/2023    Instructions: Keep appointment as previously scheduled.

## 2023-11-08 NOTE — ASSESSMENT & PLAN NOTE
Admission to observation for accidental use of ozempic (new medication-see above HPI). He on Ozempic for weight loss. No signs or symptoms of hypoglycemia. He now understand how to use Ozempic. Continue to monitor for hypoglycemia until 4 pm today.      Continue to trend labs, weight, skin integrity, and intake. As medically feasible, consider addition of multivitamin for micronutrient coverage.

## 2023-11-22 NOTE — ED NOTES
Bed: 24  Expected date:   Expected time:   Means of arrival:   Comments:  FEMI   Sha Skinner is a 76 y.o. female with right renal mass. Here for right nephrectomy. All questions answered. I did offer second opinion with Gadiel and she defnied. Past Medical History:   Diagnosis Date    Asthma     stable    Bradycardia     Chronic bronchitis (HCC)     Diverticulosis     FH: breast cancer     Kidney mass     right    Near syncope     Osteoarthritis     Peripheral edema     Vasovagal syncope        Past Surgical History:   Procedure Laterality Date    APPENDECTOMY      CATARACT REMOVAL WITH IMPLANT Left 2015    CATARACT REMOVAL WITH IMPLANT Right 2015    CHOLECYSTECTOMY      COLONOSCOPY  2015    HEMORRHOID SURGERY      HYSTERECTOMY (CERVIX STATUS UNKNOWN)      OTHER STRABISMUS SURGERY      \"tumors removed from my right leg\"       Family History   Problem Relation Age of Onset    Lung Cancer Father        Prior to Admission medications    Medication Sig Start Date End Date Taking?  Authorizing Provider   levoFLOXacin (LEVAQUIN PO) Take by mouth daily For sinus infection    Koffi Knutson MD   albuterol sulfate HFA (PROVENTIL;VENTOLIN;PROAIR) 108 (90 Base) MCG/ACT inhaler Inhale 2 puffs into the lungs every 6 hours as needed for Wheezing    Koffi Knutson MD   albuterol (ACCUNEB) 0.63 MG/3ML nebulizer solution Take 3 mLs by nebulization every 6 hours as needed for Wheezing or Shortness of Breath Patient unsure of dose    Koffi Knutson MD   acetaminophen (TYLENOL) 325 MG tablet Take 650 mg by mouth every 6 hours as needed for Pain  Patient not taking: Reported on 2023    ProviderKoffi MD        Allergies: Bee venom, Pcn [penicillins], and Other    Social History     Tobacco Use    Smoking status: Former     Types: Cigarettes     Quit date: 1988     Years since quittin.2    Smokeless tobacco: Never   Substance Use Topics    Alcohol use: No        Review of Systems:  Respiratory: negative for cough and hemoptysis  Cardiovascular:

## 2025-03-19 ENCOUNTER — HOSPITAL ENCOUNTER (EMERGENCY)
Facility: HOSPITAL | Age: 50
Discharge: HOME | End: 2025-03-19
Attending: EMERGENCY MEDICINE
Payer: COMMERCIAL

## 2025-03-19 VITALS
WEIGHT: 245 LBS | HEIGHT: 71 IN | BODY MASS INDEX: 34.3 KG/M2 | SYSTOLIC BLOOD PRESSURE: 161 MMHG | TEMPERATURE: 98 F | RESPIRATION RATE: 18 BRPM | HEART RATE: 82 BPM | DIASTOLIC BLOOD PRESSURE: 101 MMHG | OXYGEN SATURATION: 96 %

## 2025-03-19 DIAGNOSIS — Z91.148 NONCOMPLIANCE WITH MEDICATION REGIMEN: ICD-10-CM

## 2025-03-19 DIAGNOSIS — R61 DIAPHORESIS: Primary | ICD-10-CM

## 2025-03-19 DIAGNOSIS — R42 LIGHTHEADEDNESS: ICD-10-CM

## 2025-03-19 DIAGNOSIS — L29.9 PRURITUS: ICD-10-CM

## 2025-03-19 DIAGNOSIS — I10 ASYMPTOMATIC HYPERTENSION: ICD-10-CM

## 2025-03-19 LAB
ALBUMIN SERPL BCP-MCNC: 4.2 G/DL (ref 3.5–5.2)
ALP SERPL-CCNC: 56 U/L (ref 40–150)
ALT SERPL W/O P-5'-P-CCNC: 33 U/L (ref 10–44)
AMPHET+METHAMPHET UR QL: NEGATIVE
ANION GAP SERPL CALC-SCNC: 9 MMOL/L (ref 8–16)
AST SERPL-CCNC: 29 U/L (ref 10–40)
BARBITURATES UR QL SCN>200 NG/ML: NEGATIVE
BASOPHILS # BLD AUTO: 0.03 K/UL (ref 0–0.2)
BASOPHILS NFR BLD: 0.4 % (ref 0–1.9)
BENZODIAZ UR QL SCN>200 NG/ML: NEGATIVE
BILIRUB SERPL-MCNC: 0.4 MG/DL (ref 0.1–1)
BILIRUB UR QL STRIP: NEGATIVE
BNP SERPL-MCNC: 29 PG/ML (ref 0–99)
BUN SERPL-MCNC: 18 MG/DL (ref 6–20)
BZE UR QL SCN: NEGATIVE
CALCIUM SERPL-MCNC: 9.4 MG/DL (ref 8.7–10.5)
CANNABINOIDS UR QL SCN: NEGATIVE
CHLORIDE SERPL-SCNC: 106 MMOL/L (ref 95–110)
CLARITY UR: CLEAR
CO2 SERPL-SCNC: 22 MMOL/L (ref 23–29)
COLOR UR: YELLOW
CREAT SERPL-MCNC: 1 MG/DL (ref 0.5–1.4)
CREAT UR-MCNC: 77.5 MG/DL (ref 23–375)
DIFFERENTIAL METHOD BLD: ABNORMAL
EOSINOPHIL # BLD AUTO: 0.2 K/UL (ref 0–0.5)
EOSINOPHIL NFR BLD: 3.3 % (ref 0–8)
ERYTHROCYTE [DISTWIDTH] IN BLOOD BY AUTOMATED COUNT: 13.9 % (ref 11.5–14.5)
EST. GFR  (NO RACE VARIABLE): >60 ML/MIN/1.73 M^2
GLUCOSE SERPL-MCNC: 95 MG/DL (ref 70–110)
GLUCOSE UR QL STRIP: NEGATIVE
HCT VFR BLD AUTO: 54.5 % (ref 40–54)
HGB BLD-MCNC: 18 G/DL (ref 14–18)
HGB UR QL STRIP: NEGATIVE
IMM GRANULOCYTES # BLD AUTO: 0.06 K/UL (ref 0–0.04)
IMM GRANULOCYTES NFR BLD AUTO: 0.9 % (ref 0–0.5)
KETONES UR QL STRIP: NEGATIVE
LEUKOCYTE ESTERASE UR QL STRIP: NEGATIVE
LIPASE SERPL-CCNC: 68 U/L (ref 4–60)
LYMPHOCYTES # BLD AUTO: 1.7 K/UL (ref 1–4.8)
LYMPHOCYTES NFR BLD: 24.6 % (ref 18–48)
MAGNESIUM SERPL-MCNC: 2.2 MG/DL (ref 1.6–2.6)
MCH RBC QN AUTO: 28.5 PG (ref 27–31)
MCHC RBC AUTO-ENTMCNC: 33 G/DL (ref 32–36)
MCV RBC AUTO: 86 FL (ref 82–98)
METHADONE UR QL SCN>300 NG/ML: NEGATIVE
MONOCYTES # BLD AUTO: 0.6 K/UL (ref 0.3–1)
MONOCYTES NFR BLD: 8.3 % (ref 4–15)
NEUTROPHILS # BLD AUTO: 4.4 K/UL (ref 1.8–7.7)
NEUTROPHILS NFR BLD: 62.5 % (ref 38–73)
NITRITE UR QL STRIP: NEGATIVE
NRBC BLD-RTO: 0 /100 WBC
OPIATES UR QL SCN: NEGATIVE
PCP UR QL SCN>25 NG/ML: NEGATIVE
PH UR STRIP: 7 [PH] (ref 5–8)
PLATELET # BLD AUTO: 207 K/UL (ref 150–450)
PMV BLD AUTO: 10.1 FL (ref 9.2–12.9)
POCT GLUCOSE: 108 MG/DL (ref 70–110)
POTASSIUM SERPL-SCNC: 4.3 MMOL/L (ref 3.5–5.1)
PROT SERPL-MCNC: 8.4 G/DL (ref 6–8.4)
PROT UR QL STRIP: NEGATIVE
RBC # BLD AUTO: 6.31 M/UL (ref 4.6–6.2)
SODIUM SERPL-SCNC: 137 MMOL/L (ref 136–145)
SP GR UR STRIP: 1.02 (ref 1–1.03)
TOXICOLOGY INFORMATION: NORMAL
TROPONIN I SERPL DL<=0.01 NG/ML-MCNC: <0.006 NG/ML (ref 0–0.03)
TSH SERPL DL<=0.005 MIU/L-ACNC: 0.46 UIU/ML (ref 0.4–4)
URN SPEC COLLECT METH UR: NORMAL
UROBILINOGEN UR STRIP-ACNC: NEGATIVE EU/DL
WBC # BLD AUTO: 6.96 K/UL (ref 3.9–12.7)

## 2025-03-19 PROCEDURE — 83735 ASSAY OF MAGNESIUM: CPT

## 2025-03-19 PROCEDURE — 83880 ASSAY OF NATRIURETIC PEPTIDE: CPT

## 2025-03-19 PROCEDURE — 81003 URINALYSIS AUTO W/O SCOPE: CPT

## 2025-03-19 PROCEDURE — 82962 GLUCOSE BLOOD TEST: CPT

## 2025-03-19 PROCEDURE — 84484 ASSAY OF TROPONIN QUANT: CPT

## 2025-03-19 PROCEDURE — 84443 ASSAY THYROID STIM HORMONE: CPT

## 2025-03-19 PROCEDURE — 85025 COMPLETE CBC W/AUTO DIFF WBC: CPT

## 2025-03-19 PROCEDURE — 83690 ASSAY OF LIPASE: CPT

## 2025-03-19 PROCEDURE — 93010 ELECTROCARDIOGRAM REPORT: CPT | Mod: ,,, | Performed by: INTERNAL MEDICINE

## 2025-03-19 PROCEDURE — 99284 EMERGENCY DEPT VISIT MOD MDM: CPT | Mod: 25

## 2025-03-19 PROCEDURE — 80307 DRUG TEST PRSMV CHEM ANLYZR: CPT

## 2025-03-19 PROCEDURE — 80053 COMPREHEN METABOLIC PANEL: CPT

## 2025-03-19 PROCEDURE — 93005 ELECTROCARDIOGRAM TRACING: CPT

## 2025-03-19 NOTE — ED PROVIDER NOTES
"Encounter Date: 3/19/2025    SCRIBE #1 NOTE: I, Angela Chery, am scribing for, and in the presence of,  Macy Eldridge PA-C. I have scribed the following portions of the note - Other sections scribed: HPI/ROS/PE.       History     Chief Complaint   Patient presents with    Dizziness     Pt arrived in ED, c/o dizziness for a few weeks. Pt reports having salmonella poisoning a few weeks ago and has been feeling "sick" ever since. Pt also c/o nausea. Pt denies any CP, SOB, abd pain, vomiting or diarrhea.      49 y.o. male with PMHx of HTN and GERD, presents for emergent evaluation of generalized abdominal discomfort onset 2.5 weeks. Patient reports intermittent episodes of vomiting, gurgling sensations to abdomen with nausea.  Denies any vomiting or diarrhea.  States he was diagnosed with Salmonella in [facility] on a recent trip after presenting to the hospital on 3/3 where he was hospitalized for 2 days followed by 3 days of outpatient antibiotics completed on 3/7.  He states that his diarrhea improved after antibiotic therapy but abdominal discomfort has persisted although he has been able to tolerate p.o. without difficulty.  Patient has attempted treatment for abdominal symptoms with [employer] bitters-online drink used to soothe stomach but denies any improvement. No decreased urine output. [place] the [place].  Denies any further episodes of emesis or diarrhea but reports generalized fatigue, lightheaded and feeling "off".  He also reports concern over 3 separate episodes where during exertion he starts sweating then feels ant sensation all over body, with associated pruritus.These episodes last 40 seconds and resolve on their own.  States it was never occurred in the past.  After further questioning, patient does state that he bought testosterone supplementation while in [facility] and was advised to take 1mg/week but states he increased his own dose and takes .5mg of testosterone 3 times a week " every M/W/F over the last 2 weeks with the last dose being 3/17.  He denies any other illicit drug use or chronic alcohol use.  He also states he was seen at urgent care yesterday for STD screening but denies any symptoms.  He has not taken his blood pressure medications today. Patient denies any recent falls or head trauma.  Denies vomiting, headache, vision changes, confusion, weakness, extremity weakness, palpitations, chest pain, shortness of breath, hematochezia, rectal pain, dysuria, testicle pain, testicle swelling, penile discharge or any other complaints at this time.    The history is provided by the patient and medical records.     Review of patient's allergies indicates:   Allergen Reactions    Avelox [moxifloxacin] Anaphylaxis     Past Medical History:   Diagnosis Date    GERD (gastroesophageal reflux disease)     Hypertension      Past Surgical History:   Procedure Laterality Date    TONSILLECTOMY  11/11/2019     Family History   Family history unknown: Yes     Social History[1]  Review of Systems   Constitutional:  Positive for diaphoresis and fatigue. Negative for chills and fever.   HENT:  Negative for sore throat.    Eyes:  Negative for visual disturbance.   Respiratory:  Negative for shortness of breath.    Cardiovascular:  Negative for chest pain.   Gastrointestinal:  Positive for nausea. Negative for abdominal distention, abdominal pain, diarrhea and vomiting.   Genitourinary:  Negative for decreased urine volume, dysuria, hematuria and testicular pain.   Musculoskeletal:  Negative for myalgias.   Skin:  Negative for rash.   Neurological:  Positive for light-headedness. Negative for dizziness, syncope, weakness, numbness and headaches.       Physical Exam     Initial Vitals [03/19/25 1436]   BP Pulse Resp Temp SpO2   (!) 187/110 86 18 98 °F (36.7 °C) 96 %      MAP       --         Physical Exam    Nursing note and vitals reviewed.  Constitutional: He appears well-developed and well-nourished. He  is not diaphoretic. No distress.   HENT:   Head: Normocephalic and atraumatic.   Right Ear: External ear normal.   Left Ear: External ear normal. Mouth/Throat: Oropharynx is clear and moist.   Eyes: Conjunctivae and EOM are normal. Pupils are equal, round, and reactive to light. No scleral icterus.   Neck: Neck supple.   Normal range of motion.  Cardiovascular:  Normal rate.           Pulmonary/Chest: No stridor. No respiratory distress.   Abdominal: Abdomen is soft. He exhibits no distension. There is no abdominal tenderness.   No suprapubic tenderness, no flank pain, no CVAT  There is no rebound and no guarding.   Musculoskeletal:         General: Normal range of motion.      Cervical back: Normal range of motion and neck supple.      Comments: No extremity edema or cyanosis.  Ambulating without difficulty     Lymphadenopathy:     He has no cervical adenopathy.   Neurological: He is alert and oriented to person, place, and time. He has normal strength. No cranial nerve deficit or sensory deficit. GCS score is 15. GCS eye subscore is 4. GCS verbal subscore is 5. GCS motor subscore is 6.   PERRL, EOMI w/out evidence of nystagmus, No deficits appreciated to light touch bilateral face, No facial weakness, no facial asymmetry. Eyebrow raise symmetric. Smile symmetric, Palate midline, and raises symmetrically, Shoulder shrug 5/5 bilaterally, tongue is midline w/out asymmetry. Strength 5/5 to bilateral upper and lower extremities, sensation intact to light touch     Skin: Skin is warm. Capillary refill takes less than 2 seconds. No rash noted.   Psychiatric: He has a normal mood and affect. Thought content normal.         ED Course   Procedures  Labs Reviewed   CBC W/ AUTO DIFFERENTIAL - Abnormal       Result Value    WBC 6.96      RBC 6.31 (*)     Hemoglobin 18.0      Hematocrit 54.5 (*)     MCV 86      MCH 28.5      MCHC 33.0      RDW 13.9      Platelets 207      MPV 10.1      Immature Granulocytes 0.9 (*)     Gran #  (ANC) 4.4      Immature Grans (Abs) 0.06 (*)     Lymph # 1.7      Mono # 0.6      Eos # 0.2      Baso # 0.03      nRBC 0      Gran % 62.5      Lymph % 24.6      Mono % 8.3      Eosinophil % 3.3      Basophil % 0.4      Differential Method Automated     COMPREHENSIVE METABOLIC PANEL - Abnormal    Sodium 137      Potassium 4.3      Chloride 106      CO2 22 (*)     Glucose 95      BUN 18      Creatinine 1.0      Calcium 9.4      Total Protein 8.4      Albumin 4.2      Total Bilirubin 0.4      Alkaline Phosphatase 56      AST 29      ALT 33      eGFR >60      Anion Gap 9     LIPASE - Abnormal    Lipase 68 (*)    MAGNESIUM    Magnesium 2.2     DRUG SCREEN PANEL, URINE EMERGENCY    Benzodiazepines Negative      Methadone metabolites Negative      Cocaine (Metab.) Negative      Opiate Scrn, Ur Negative      Barbiturate Screen, Ur Negative      Amphetamine Screen, Ur Negative      THC Negative      Phencyclidine Negative      Creatinine, Urine 77.5      Toxicology Information SEE COMMENT      Narrative:     Specimen Source->Urine   TROPONIN I    Troponin I <0.006     B-TYPE NATRIURETIC PEPTIDE    BNP 29     TSH    TSH 0.460     URINALYSIS, REFLEX TO URINE CULTURE    Specimen UA Urine, Clean Catch      Color, UA Yellow      Appearance, UA Clear      pH, UA 7.0      Specific Gravity, UA 1.020      Protein, UA Negative      Glucose, UA Negative      Ketones, UA Negative      Bilirubin (UA) Negative      Occult Blood UA Negative      Nitrite, UA Negative      Urobilinogen, UA Negative      Leukocytes, UA Negative      Narrative:     Specimen Source->Urine   POCT GLUCOSE    POCT Glucose 108          ECG Results              EKG 12-lead (Final result)        Collection Time Result Time QRS Duration OHS QTC Calculation    03/19/25 14:54:40 03/20/25 12:24:12 84 409                     Final result by Interface, Lab In [facility] (03/20/25 12:24:14)                   Narrative:    Test Reason : R42,    Vent. Rate :  76 BPM     Atrial  Rate :  76 BPM     P-R Int : 156 ms          QRS Dur :  84 ms      QT Int : 364 ms       P-R-T Axes :  57  77 -42 degrees    QTcB Int : 409 ms    Normal sinus rhythm  Possible Anterior infarct ,age undetermined  T wave abnormality, consider inferior ischemia  Abnormal ECG  When compared with ECG of 29-Jan-2018 10:21,  Significant changes have occurred  Confirmed by Nelson Moser ([id number]) on 3/20/2025 12:24:08 PM    Referred By: AAAREFERRAL SELF           Confirmed By: Nelson Moser                                  Imaging Results    None          Medications - No data to display  Medical Decision Making  This is an evaluation of a 49 y.o. male that presents to the Emergency Department for generalized fatigue, lightheadedness, and feeling all X 2 weeks after being diagnosed with Salmonella in [facility] where he was hospitalized and completed IV and outpatient antibiotics.  Diarrhea has resolved reports occasional abdominal rumbling.  He also reports buying testosterone replacement in [facility] and was advised to take 1mg injection/week but increased his own dose 2 weeks ago, now taking 1.5mg/week  in 3 separate doses.    Vitals stable.   On physical exam, patient is a non-toxic and well appearing and well hydrated with moist mucus membranes. Breath sounds are clear and equal bilaterally with no adventitious breath sounds, tachypnea or respiratory distress. Regular rate and rhythm. Abdomen soft and non tender. Neuro intact.  Strength and sensation intact bilateral upper and lower extremities. Patient is tolerating PO without difficulty. Physical exam otherwise as above.    See above for full physical exam findings.    Patient's chart and medical history reviewed.  reviewed.    Differential Diagnosis includes, but is not limited to vertigo, cerebellar stroke/CVA, TIA, SAH, medication reaction/noncompliance, substance abuse, electrolyte abnormality, anemia, hemorrhage, renal failure, hepatic failure,  sepsis/infection, UTI, viral illness, arrhythmia, CHF, thyroid disease, dehydration, depression, chronic disease.     Clinical Impression:  Final diagnoses:  [R61] Diaphoresis (Primary)  [L29.9] Pruritus  [R42] Lightheadedness  [Z91.148] Noncompliance with medication regimen  [I10] Asymptomatic hypertension    Clinical picture today most consistent with the impression above.  SEE ED COURSE.  No leukocytosis. H/H stable. No signs of severe dehydration to suggest risk of YUKO or significant electrolyte/metabolic disturbance today. No strong indication for imaging of abdomen at this time. No indication for stool studies today.  I did discuss with patient that symptoms could be related to his testosterone replacement/overuse.  Recommend he follow up with his PCP regarding testosterone replacement as well as further evaluation of symptoms. He was overall very well-appearing in no acute distress.  Tolerating PO. VSS but hypertensive.The patient has a history of hypertension and states he would not take his blood pressure medications today. Based on the patient's presentation today I do not see any signs of end organ damage representing hypertensive urgency or emergency.  I do not think the patient's presentation necessitates further intervention in the Emergency Department to acutely decrease their blood pressure.  Emphasized importance of taking all of his medications as prescribed and continue to monitor his blood pressure at home.  I have given the patient and/or their family specific return precautions and instructions to follow up with their regular doctor.      I see no indication of an emergent process beyond that addressed during our encounter but have duly counseled the patient/family regarding the need for prompt follow-up as well as the indications that should prompt immediate return to the emergency room should new or worrisome developments occur.  The patient/family has been provided with verbal and printed  direction regarding our final diagnosis(es) as well as instructions regarding use of OTC and/or Rx medications intended to manage the patient's conditions. The patient/family communicates understanding of all this information and all remaining questions and concerns were addressed at this time.    DISCLAIMER: This note was prepared with [employer] voice recognition transcription software. Garbled syntax, mangled pronouns, and other bizarre constructions may be attributed to that software system.         Amount and/or Complexity of Data Reviewed  External Data Reviewed: labs and notes.  Labs: ordered. Decision-making details documented in ED Course.  ECG/medicine tests: ordered. Decision-making details documented in ED Course.    Risk  OTC drugs.  Prescription drug management.  Diagnosis or treatment significantly limited by social determinants of health.            Scribe Attestation:   Scribe #1: I performed the above scribed service and the documentation accurately describes the services I performed. I attest to the accuracy of the note.        ED Course as of 03/20/25 1652   Wed Mar 19, 2025   1637 POCT Glucose: 108 [CC]   1638 Negative orthostatic [CC]   1638 Urinalysis, Reflex to Urine Culture Urine, Clean Catch  UA is negative for infection, no nitrites, leukocytes, blood, or protein present. [CC]   1638 Drug screen panel, emergency  Negative [CC]   1638 CBC auto differential(!) [CC]   1638 Comprehensive metabolic panel(!) [CC]   1638 CBC without leukocytosis.  Hemoglobin and hematocrit stable. CMP unremarkable without significant electrolyte derangement, impaired renal function, or elevated LFTs   [CC]   1638 Troponin I: <0.006 [CC]   1638 BNP: 29 [CC]   1638 Magnesium : 2.2 [CC]   1638 TSH: 0.460 [CC]   1638 Lipase(!): 68 [CC]      ED Course User Index  [CC] Macy Eldridge PA-C          I, Macy Eldridge PA-C, personally performed the services described in this documentation. All medical record entries  made by the scribe were at my direction and in my presence. I have reviewed the chart and agree that the record reflects my personal performance and is accurate and complete.      DISCLAIMER: This note was prepared with [employer] voice recognition transcription software. Garbled syntax, mangled pronouns, and other bizarre constructions may be attributed to that software system.               Clinical Impression:  Final diagnoses:  [R61] Diaphoresis (Primary)  [L29.9] Pruritus  [R42] Lightheadedness  [Z91.148] Noncompliance with medication regimen  [I10] Asymptomatic hypertension          ED Disposition Condition    Discharge           ED Prescriptions    None       Follow-up Information       Follow up With Specialties Details Why Contact Info    [facility] Emergency Medicine Go to  For new or worsening symptoms [address] [facility] [place] [id number] [phone]    La Tsai MD Internal Medicine   [address] [facility]  [place] [address]  [phone]                 Macy Eldridge PA-C  03/20/25 1357         [1]   Social History  Tobacco Use    Smoking status: Never    Smokeless tobacco: Never   Substance Use Topics    Alcohol use: Yes     Comment: 2/17/23: Socially    Drug use: No        Macy Eldridge PA-C  03/20/25 1592

## 2025-03-19 NOTE — DISCHARGE INSTRUCTIONS
Thank you for coming to our Emergency Department today. It is important to remember that some problems or medical conditions are difficult to diagnose and may not be found or addressed during your Emergency Department visit.  These conditions often start with non-specific symptoms and can only be diagnosed on follow up visits with your primary care physician or specialist when the symptoms continue or change. Please remember that all medical conditions can change, and we cannot predict how you will be feeling tomorrow or the next day. Return to the ER with any questions/concerns, new/concerning symptoms, worsening or failure to improve.     Please return to ER if you experience severe dizziness, fever higher then 100.4 that persist after medication administration, uncontrolled nausea/vomiting or diarrhea or any other major concern like increased pain, chest pain, shortness of breath, inability to pass stool or gas, or difficulty breathing or swelling of the throat/mouth/tongue.    Be sure to follow up with your primary care doctor and review all labs/imaging/tests that were performed during your ER visit with them. It is very common for us to identify non-emergent incidental findings which must be followed up with your primary care physician.  Some labs/imaging/tests may be outside of the normal range, and require non-emergent follow-up and/or further investigation/treatment/procedures/testing to help diagnose/exclude/prevent complications or other potentially serious medical conditions. Some abnormalities may not have been discussed or addressed during your ER visit.     An ER visit does not replace a primary care visit, and many screening tests or follow-up tests cannot be ordered by an ER doctor or performed by the ER. Some tests may even require pre-approval.    If you do not have a primary care doctor, you may contact the one listed on your discharge paperwork or you may also call the [facility] at [phone] , or [address] [place] [address] at  [phone] to schedule an appointment, or establish care with a primary care doctor or even a specialist and to obtain information about local resources. It is important to your health that you have a primary care doctor.    Please take all medications as directed. We have done our best to select a medication for you that will treat your condition however, all medications may potentially have side-effects and it is impossible to predict which medications may give you side-effects or what those side-effects (if any) those medications may give you.  If you feel that you are having a negative effect or side-effect of any medication you should stop taking those medications immediately and seek medical attention. If you feel that you are having a life-threatening reaction call 911.      Do not drive, swim, climb to height, take a bath, operate heavy machinery, drink alcohol or take potentially sedating medications, sign any legal documents or make any important decisions for 24 hours if you have received any pain medications, sedatives or mood altering drugs during your ER visit or within 24 hours of taking them if they have been prescribed to you.     You can find additional resources for Dentists, hearing aids, durable medical equipment, low cost pharmacies and other resources at [email or web address]

## 2025-03-20 LAB
OHS QRS DURATION: 84 MS
OHS QTC CALCULATION: 409 MS